# Patient Record
Sex: FEMALE | Race: WHITE | NOT HISPANIC OR LATINO | URBAN - METROPOLITAN AREA
[De-identification: names, ages, dates, MRNs, and addresses within clinical notes are randomized per-mention and may not be internally consistent; named-entity substitution may affect disease eponyms.]

---

## 2018-01-10 ENCOUNTER — OUTPATIENT (OUTPATIENT)
Dept: OUTPATIENT SERVICES | Facility: HOSPITAL | Age: 58
LOS: 1 days | Discharge: HOME | End: 2018-01-10
Payer: COMMERCIAL

## 2018-01-10 VITALS
HEART RATE: 82 BPM | TEMPERATURE: 98 F | HEIGHT: 59 IN | SYSTOLIC BLOOD PRESSURE: 135 MMHG | RESPIRATION RATE: 20 BRPM | OXYGEN SATURATION: 100 % | WEIGHT: 147.93 LBS | DIASTOLIC BLOOD PRESSURE: 74 MMHG

## 2018-01-10 DIAGNOSIS — Z01.818 ENCOUNTER FOR OTHER PREPROCEDURAL EXAMINATION: ICD-10-CM

## 2018-01-10 DIAGNOSIS — Z98.891 HISTORY OF UTERINE SCAR FROM PREVIOUS SURGERY: Chronic | ICD-10-CM

## 2018-01-10 DIAGNOSIS — M85.80 OTHER SPECIFIED DISORDERS OF BONE DENSITY AND STRUCTURE, UNSPECIFIED SITE: ICD-10-CM

## 2018-01-10 DIAGNOSIS — M23.252 DERANGEMENT OF POSTERIOR HORN OF LATERAL MENISCUS DUE TO OLD TEAR OR INJURY, LEFT KNEE: ICD-10-CM

## 2018-01-10 DIAGNOSIS — M94.262 CHONDROMALACIA, LEFT KNEE: ICD-10-CM

## 2018-01-10 DIAGNOSIS — M65.862 OTHER SYNOVITIS AND TENOSYNOVITIS, LEFT LOWER LEG: ICD-10-CM

## 2018-01-10 DIAGNOSIS — Z90.710 ACQUIRED ABSENCE OF BOTH CERVIX AND UTERUS: Chronic | ICD-10-CM

## 2018-01-10 DIAGNOSIS — K21.9 GASTRO-ESOPHAGEAL REFLUX DISEASE WITHOUT ESOPHAGITIS: ICD-10-CM

## 2018-01-10 DIAGNOSIS — Z98.890 OTHER SPECIFIED POSTPROCEDURAL STATES: Chronic | ICD-10-CM

## 2018-01-10 DIAGNOSIS — E66.9 OBESITY, UNSPECIFIED: ICD-10-CM

## 2018-01-10 DIAGNOSIS — M17.12 UNILATERAL PRIMARY OSTEOARTHRITIS, LEFT KNEE: ICD-10-CM

## 2018-01-10 DIAGNOSIS — S83.282A OTHER TEAR OF LATERAL MENISCUS, CURRENT INJURY, LEFT KNEE, INITIAL ENCOUNTER: ICD-10-CM

## 2018-01-10 LAB
ANION GAP SERPL CALC-SCNC: 6 MMOL/L — SIGNIFICANT CHANGE UP (ref 5–17)
APTT BLD: 28.8 SEC — SIGNIFICANT CHANGE UP (ref 27.5–37.4)
BASOPHILS # BLD AUTO: 0.1 K/UL — SIGNIFICANT CHANGE UP (ref 0–0.2)
BASOPHILS NFR BLD AUTO: 0.9 % — SIGNIFICANT CHANGE UP (ref 0–2)
BUN SERPL-MCNC: 14 MG/DL — SIGNIFICANT CHANGE UP (ref 7–23)
CALCIUM SERPL-MCNC: 8.6 MG/DL — SIGNIFICANT CHANGE UP (ref 8.5–10.1)
CHLORIDE SERPL-SCNC: 106 MMOL/L — SIGNIFICANT CHANGE UP (ref 96–108)
CO2 SERPL-SCNC: 29 MMOL/L — SIGNIFICANT CHANGE UP (ref 22–31)
CREAT SERPL-MCNC: 0.57 MG/DL — SIGNIFICANT CHANGE UP (ref 0.5–1.3)
EOSINOPHIL # BLD AUTO: 0.1 K/UL — SIGNIFICANT CHANGE UP (ref 0–0.5)
EOSINOPHIL NFR BLD AUTO: 2.2 % — SIGNIFICANT CHANGE UP (ref 0–6)
GLUCOSE SERPL-MCNC: 81 MG/DL — SIGNIFICANT CHANGE UP (ref 70–99)
HCT VFR BLD CALC: 38 % — SIGNIFICANT CHANGE UP (ref 34.5–45)
HGB BLD-MCNC: 12.4 G/DL — SIGNIFICANT CHANGE UP (ref 11.5–15.5)
INR BLD: 0.99 RATIO — SIGNIFICANT CHANGE UP (ref 0.88–1.16)
LYMPHOCYTES # BLD AUTO: 2.4 K/UL — SIGNIFICANT CHANGE UP (ref 1–3.3)
LYMPHOCYTES # BLD AUTO: 41.6 % — SIGNIFICANT CHANGE UP (ref 13–44)
MCHC RBC-ENTMCNC: 29.2 PG — SIGNIFICANT CHANGE UP (ref 27–34)
MCHC RBC-ENTMCNC: 32.7 GM/DL — SIGNIFICANT CHANGE UP (ref 32–36)
MCV RBC AUTO: 89.4 FL — SIGNIFICANT CHANGE UP (ref 80–100)
MONOCYTES # BLD AUTO: 0.3 K/UL — SIGNIFICANT CHANGE UP (ref 0–0.9)
MONOCYTES NFR BLD AUTO: 5.4 % — SIGNIFICANT CHANGE UP (ref 2–14)
NEUTROPHILS # BLD AUTO: 2.9 K/UL — SIGNIFICANT CHANGE UP (ref 1.8–7.4)
NEUTROPHILS NFR BLD AUTO: 49.9 % — SIGNIFICANT CHANGE UP (ref 43–77)
PLATELET # BLD AUTO: 217 K/UL — SIGNIFICANT CHANGE UP (ref 150–400)
POTASSIUM SERPL-MCNC: 3.6 MMOL/L — SIGNIFICANT CHANGE UP (ref 3.5–5.3)
POTASSIUM SERPL-SCNC: 3.6 MMOL/L — SIGNIFICANT CHANGE UP (ref 3.5–5.3)
PROTHROM AB SERPL-ACNC: 10.7 SEC — SIGNIFICANT CHANGE UP (ref 9.8–12.7)
RBC # BLD: 4.25 M/UL — SIGNIFICANT CHANGE UP (ref 3.8–5.2)
RBC # FLD: 12 % — SIGNIFICANT CHANGE UP (ref 10.3–14.5)
SODIUM SERPL-SCNC: 141 MMOL/L — SIGNIFICANT CHANGE UP (ref 135–145)
WBC # BLD: 5.8 K/UL — SIGNIFICANT CHANGE UP (ref 3.8–10.5)
WBC # FLD AUTO: 5.8 K/UL — SIGNIFICANT CHANGE UP (ref 3.8–10.5)

## 2018-01-10 PROCEDURE — 93010 ELECTROCARDIOGRAM REPORT: CPT

## 2018-01-10 NOTE — H&P PST ADULT - PMH
Chronic sinusitis    GERD (gastroesophageal reflux disease)    Hypercholesterolemia    Obesity    Osteoarthritis    Osteopenia Chronic sinusitis    GERD (gastroesophageal reflux disease)    Hypercholesterolemia    Obesity    Osteoarthritis    Osteopenia    Torn meniscus  left

## 2018-01-10 NOTE — H&P PST ADULT - ASSESSMENT
56 y/o female with left knee meniscus tear. S/P left knee arthroscopy 2x. Complain of left knee pain. Scheduled for left knee arthroscopy, meniscectomy, debridement with Dr. Sudheer MILNER.    Plan  1. Stop all NSAIDS, herbal supplements and vitamins for 7 days.  2. NPO at midnight.  3. Use EZ sponges as directed

## 2018-01-10 NOTE — H&P PST ADULT - NSANTHOSAYNRD_GEN_A_CORE
No. ALVARO screening performed.  STOP BANG Legend: 0-2 = LOW Risk; 3-4 = INTERMEDIATE Risk; 5-8 = HIGH Risk

## 2018-01-10 NOTE — H&P PST ADULT - PSH
H/O  section  1994  H/O: hysterectomy  b/l salpingectomy, oophoretomy left  S/P left knee arthroscopy  ,  H/O  section  1994  H/O: hysterectomy  b/l salpingectomy, oophorectomy left  S/P left knee arthroscopy  ,

## 2018-01-10 NOTE — H&P PST ADULT - HISTORY OF PRESENT ILLNESS
56 y/o female with left knee meniscus tear. S/P left knee arthroscopy 2x. Complain of left knee pain. Here today for PST for left knee arthroscopy, meniscectomy, debridement.

## 2018-01-18 RX ORDER — FAMOTIDINE 10 MG/ML
20 INJECTION INTRAVENOUS ONCE
Qty: 0 | Refills: 0 | Status: COMPLETED | OUTPATIENT
Start: 2018-01-19 | End: 2018-01-19

## 2018-01-18 RX ORDER — ACETAMINOPHEN 500 MG
975 TABLET ORAL ONCE
Qty: 0 | Refills: 0 | Status: COMPLETED | OUTPATIENT
Start: 2018-01-19 | End: 2018-01-19

## 2018-01-18 RX ORDER — SODIUM CHLORIDE 9 MG/ML
3 INJECTION INTRAMUSCULAR; INTRAVENOUS; SUBCUTANEOUS EVERY 8 HOURS
Qty: 0 | Refills: 0 | Status: DISCONTINUED | OUTPATIENT
Start: 2018-01-19 | End: 2018-02-03

## 2018-01-19 ENCOUNTER — RESULT REVIEW (OUTPATIENT)
Age: 58
End: 2018-01-19

## 2018-01-19 ENCOUNTER — OUTPATIENT (OUTPATIENT)
Dept: OUTPATIENT SERVICES | Facility: HOSPITAL | Age: 58
LOS: 1 days | Discharge: HOME | End: 2018-01-19
Payer: COMMERCIAL

## 2018-01-19 VITALS
WEIGHT: 147.93 LBS | OXYGEN SATURATION: 99 % | HEART RATE: 80 BPM | HEIGHT: 59 IN | DIASTOLIC BLOOD PRESSURE: 85 MMHG | TEMPERATURE: 98 F | SYSTOLIC BLOOD PRESSURE: 137 MMHG | RESPIRATION RATE: 16 BRPM

## 2018-01-19 VITALS
OXYGEN SATURATION: 95 % | DIASTOLIC BLOOD PRESSURE: 85 MMHG | HEART RATE: 80 BPM | RESPIRATION RATE: 16 BRPM | TEMPERATURE: 98 F | SYSTOLIC BLOOD PRESSURE: 126 MMHG

## 2018-01-19 DIAGNOSIS — Z90.710 ACQUIRED ABSENCE OF BOTH CERVIX AND UTERUS: Chronic | ICD-10-CM

## 2018-01-19 DIAGNOSIS — Z98.890 OTHER SPECIFIED POSTPROCEDURAL STATES: Chronic | ICD-10-CM

## 2018-01-19 DIAGNOSIS — Z98.891 HISTORY OF UTERINE SCAR FROM PREVIOUS SURGERY: Chronic | ICD-10-CM

## 2018-01-19 PROCEDURE — 88304 TISSUE EXAM BY PATHOLOGIST: CPT | Mod: 26

## 2018-01-19 RX ORDER — ROSUVASTATIN CALCIUM 5 MG/1
1 TABLET ORAL
Qty: 0 | Refills: 0 | COMMUNITY

## 2018-01-19 RX ORDER — SODIUM CHLORIDE 9 MG/ML
1000 INJECTION INTRAMUSCULAR; INTRAVENOUS; SUBCUTANEOUS
Qty: 0 | Refills: 0 | Status: DISCONTINUED | OUTPATIENT
Start: 2018-01-19 | End: 2018-01-23

## 2018-01-19 RX ORDER — OXYCODONE HYDROCHLORIDE 5 MG/1
5 TABLET ORAL EVERY 4 HOURS
Qty: 0 | Refills: 0 | Status: DISCONTINUED | OUTPATIENT
Start: 2018-01-19 | End: 2018-01-23

## 2018-01-19 RX ORDER — ONDANSETRON 8 MG/1
4 TABLET, FILM COATED ORAL ONCE
Qty: 0 | Refills: 0 | Status: DISCONTINUED | OUTPATIENT
Start: 2018-01-19 | End: 2018-01-23

## 2018-01-19 RX ORDER — RALOXIFENE HYDROCHLORIDE 60 MG/1
1 TABLET, COATED ORAL
Qty: 0 | Refills: 0 | COMMUNITY

## 2018-01-19 RX ORDER — DEXLANSOPRAZOLE 30 MG/1
1 CAPSULE, DELAYED RELEASE ORAL
Qty: 0 | Refills: 0 | COMMUNITY

## 2018-01-19 RX ORDER — FENTANYL CITRATE 50 UG/ML
50 INJECTION INTRAVENOUS
Qty: 0 | Refills: 0 | Status: DISCONTINUED | OUTPATIENT
Start: 2018-01-19 | End: 2018-01-23

## 2018-01-19 RX ORDER — EZETIMIBE 10 MG/1
1 TABLET ORAL
Qty: 0 | Refills: 0 | COMMUNITY

## 2018-01-19 RX ADMIN — SODIUM CHLORIDE 100 MILLILITER(S): 9 INJECTION INTRAMUSCULAR; INTRAVENOUS; SUBCUTANEOUS at 10:21

## 2018-01-19 RX ADMIN — FAMOTIDINE 20 MILLIGRAM(S): 10 INJECTION INTRAVENOUS at 07:44

## 2018-01-19 RX ADMIN — Medication 975 MILLIGRAM(S): at 07:43

## 2018-01-19 RX ADMIN — OXYCODONE HYDROCHLORIDE 5 MILLIGRAM(S): 5 TABLET ORAL at 10:25

## 2018-01-19 NOTE — ASU PATIENT PROFILE, ADULT - PSH
H/O  section  1994  H/O: hysterectomy  b/l salpingectomy, oophorectomy left  S/P left knee arthroscopy  ,

## 2018-01-19 NOTE — BRIEF OPERATIVE NOTE - OPERATION/FINDINGS
tear of lateral meniscus of left knee requiring partial meniscectomy. synovectomy and chondroplasty also performed.

## 2018-01-19 NOTE — BRIEF OPERATIVE NOTE - PROCEDURE
<<-----Click on this checkbox to enter Procedure Arthroscopic lateral meniscectomy of left knee  01/19/2018    Active  [id number]

## 2018-01-19 NOTE — ASU DISCHARGE PLAN (ADULT/PEDIATRIC). - MEDICATION SUMMARY - MEDICATIONS TO TAKE
I will START or STAY ON the medications listed below when I get home from the hospital:    Zetia 10 mg oral tablet  -- 1 tab(s) by mouth once a day  -- Indication: For per pmd    Crestor 20 mg oral tablet  -- 1 tab(s) by mouth once a day (at bedtime)  -- Indication: For per pmd    Evista 60 mg oral tablet  -- 1 tab(s) by mouth once a day  -- Indication: For per pmd    Dexilant 60 mg oral delayed release capsule  -- 1 cap(s) by mouth once a day  -- Indication: For per pmd

## 2018-01-19 NOTE — ASU PATIENT PROFILE, ADULT - PMH
Chronic sinusitis    GERD (gastroesophageal reflux disease)    Hypercholesterolemia    Obesity    Osteoarthritis    Osteopenia    Torn meniscus  left

## 2018-01-19 NOTE — ASU DISCHARGE PLAN (ADULT/PEDIATRIC). - NURSING INSTRUCTIONS
Begin with liquids and light food ( tea, toast, Jello, soups). Advance to what you normally eat. Liquids should taken in adequate amounts today.Refer to multi color post op discharge instruction sheet     CALL the DOCTOR:    -Fever greater than  101F  - Signs  of infection such as : increase pain,swelling,redness,or a bad  smell coming from the wound.  -Excessive amount of bleeding.  - Any pain that appears to be getting worse.  - Vomiting  -  If you have  not urinated 8 hours after surgery or have any difficulty urinating.     A responsible adult should be with you for the rest of the day and night for your safety and to help you if you needed.    Review attached FACT SHEET if applicable. For any problems or concerns,contact your doctor. [facility] patients should call the [facility]. If you cannot reach the doctor or clinic, call [facility] Emergency Department at [phone] or go to your local Emergency Department.  A responsible adult should be with you for the rest of the day and night for your safety and to help you if you needed. Resume your medications as listed on the attached Medication Record.

## 2018-01-19 NOTE — ASU DISCHARGE PLAN (ADULT/PEDIATRIC). - NOTIFY
Swelling that continues/Fever greater than 101/Numbness, color, or temperature change to extremity/Numbness, tingling/Pain not relieved by Medications/Bleeding that does not stop

## 2018-01-23 LAB — SURGICAL PATHOLOGY FINAL REPORT - CH: SIGNIFICANT CHANGE UP

## 2018-01-25 DIAGNOSIS — M85.80 OTHER SPECIFIED DISORDERS OF BONE DENSITY AND STRUCTURE, UNSPECIFIED SITE: ICD-10-CM

## 2018-01-25 DIAGNOSIS — M17.12 UNILATERAL PRIMARY OSTEOARTHRITIS, LEFT KNEE: ICD-10-CM

## 2018-01-25 DIAGNOSIS — M94.262 CHONDROMALACIA, LEFT KNEE: ICD-10-CM

## 2018-01-25 DIAGNOSIS — M65.862 OTHER SYNOVITIS AND TENOSYNOVITIS, LEFT LOWER LEG: ICD-10-CM

## 2018-01-25 DIAGNOSIS — Z90.79 ACQUIRED ABSENCE OF OTHER GENITAL ORGAN(S): ICD-10-CM

## 2018-01-25 DIAGNOSIS — Z90.721 ACQUIRED ABSENCE OF OVARIES, UNILATERAL: ICD-10-CM

## 2018-01-25 DIAGNOSIS — K21.9 GASTRO-ESOPHAGEAL REFLUX DISEASE WITHOUT ESOPHAGITIS: ICD-10-CM

## 2018-01-25 DIAGNOSIS — E78.00 PURE HYPERCHOLESTEROLEMIA, UNSPECIFIED: ICD-10-CM

## 2018-01-25 DIAGNOSIS — J32.9 CHRONIC SINUSITIS, UNSPECIFIED: ICD-10-CM

## 2018-01-25 DIAGNOSIS — M23.252 DERANGEMENT OF POSTERIOR HORN OF LATERAL MENISCUS DUE TO OLD TEAR OR INJURY, LEFT KNEE: ICD-10-CM

## 2018-01-25 DIAGNOSIS — E66.9 OBESITY, UNSPECIFIED: ICD-10-CM

## 2018-01-25 DIAGNOSIS — Z90.710 ACQUIRED ABSENCE OF BOTH CERVIX AND UTERUS: ICD-10-CM

## 2019-01-27 ENCOUNTER — TRANSCRIPTION ENCOUNTER (OUTPATIENT)
Age: 59
End: 2019-01-27

## 2019-02-04 PROBLEM — E66.9 OBESITY, UNSPECIFIED: Chronic | Status: ACTIVE | Noted: 2018-01-10

## 2019-02-04 PROBLEM — Z00.00 ENCOUNTER FOR PREVENTIVE HEALTH EXAMINATION: Status: ACTIVE | Noted: 2019-02-04

## 2019-02-04 PROBLEM — M19.90 UNSPECIFIED OSTEOARTHRITIS, UNSPECIFIED SITE: Chronic | Status: ACTIVE | Noted: 2018-01-10

## 2019-02-04 PROBLEM — M85.80 OTHER SPECIFIED DISORDERS OF BONE DENSITY AND STRUCTURE, UNSPECIFIED SITE: Chronic | Status: ACTIVE | Noted: 2018-01-10

## 2019-02-04 PROBLEM — S83.209A UNSPECIFIED TEAR OF UNSPECIFIED MENISCUS, CURRENT INJURY, UNSPECIFIED KNEE, INITIAL ENCOUNTER: Chronic | Status: ACTIVE | Noted: 2018-01-10

## 2019-02-04 PROBLEM — E78.00 PURE HYPERCHOLESTEROLEMIA, UNSPECIFIED: Chronic | Status: ACTIVE | Noted: 2018-01-10

## 2019-02-04 PROBLEM — K21.9 GASTRO-ESOPHAGEAL REFLUX DISEASE WITHOUT ESOPHAGITIS: Chronic | Status: ACTIVE | Noted: 2018-01-10

## 2019-02-04 PROBLEM — J32.9 CHRONIC SINUSITIS, UNSPECIFIED: Chronic | Status: ACTIVE | Noted: 2018-01-10

## 2019-02-05 ENCOUNTER — RECORD ABSTRACTING (OUTPATIENT)
Age: 59
End: 2019-02-05

## 2019-02-05 DIAGNOSIS — Z83.3 FAMILY HISTORY OF DIABETES MELLITUS: ICD-10-CM

## 2019-02-05 DIAGNOSIS — I10 ESSENTIAL (PRIMARY) HYPERTENSION: ICD-10-CM

## 2019-02-05 DIAGNOSIS — Z47.89 ENCOUNTER FOR OTHER ORTHOPEDIC AFTERCARE: ICD-10-CM

## 2019-02-05 DIAGNOSIS — Z78.9 OTHER SPECIFIED HEALTH STATUS: ICD-10-CM

## 2019-02-05 DIAGNOSIS — S83.282A OTHER TEAR OF LATERAL MENISCUS, CURRENT INJURY, LEFT KNEE, INITIAL ENCOUNTER: ICD-10-CM

## 2019-02-05 DIAGNOSIS — Z82.49 FAMILY HISTORY OF ISCHEMIC HEART DISEASE AND OTHER DISEASES OF THE CIRCULATORY SYSTEM: ICD-10-CM

## 2019-02-05 DIAGNOSIS — M17.11 UNILATERAL PRIMARY OSTEOARTHRITIS, RIGHT KNEE: ICD-10-CM

## 2019-02-05 DIAGNOSIS — S83.242A OTHER TEAR OF MEDIAL MENISCUS, CURRENT INJURY, LEFT KNEE, INITIAL ENCOUNTER: ICD-10-CM

## 2019-02-12 ENCOUNTER — APPOINTMENT (OUTPATIENT)
Dept: ORTHOPEDIC SURGERY | Facility: CLINIC | Age: 59
End: 2019-02-12

## 2019-02-14 ENCOUNTER — APPOINTMENT (OUTPATIENT)
Age: 59
End: 2019-02-14
Payer: COMMERCIAL

## 2019-02-14 PROCEDURE — 20610 DRAIN/INJ JOINT/BURSA W/O US: CPT | Mod: LT

## 2019-02-19 NOTE — PROCEDURE
[de-identified] : SANGEETHA VILLA comes in today for the third Supartz injection to the left knee.  \par \par Physical Examination:\par Left Knee:\par Knee: Range of Motion in Degrees  	\par    	                        Claimant:  	            Normal:  	\par Flexion Active   	         135     	            135-degrees  	\par Flexion Passive  	         135  	            135-degrees  	\par Extension Active  	         0-5  	            0-5-degrees  	\par Extension Passive            0-5  	            0-5-degrees  	\par \par No evidence of instability in the AP plane or varus or valgus stress.  Negative  Lachman. Negative pivot shift.  Negative anterior drawer test.  Negative posterior drawer test. Negative Wade.  Negative Apley grind.  No medial or lateral joint line tenderness. Positive tenderness over the medial and lateral facet of the patella.  Positive patellofemoral crepitations.  No lateral tilting patella.  No patellar apprehension. Positive crepitation in the medial and lateral femoral condyle.  No proximal or distal swelling, edema or tenderness.  No gross motor or sensory deficits.  Mild intra-articular swelling.  No varus or valgus malalignment.  2+ DP and PT pulses.  Skin is intact.  No rashes, scars or lesions.  \par \par Impression: \par Osteoarthritis of the left knee\par \par Consent:\par The risks and benefits of the procedure were discussed with the patient in detail.  Upon verbal consent of the patient, we proceeded with the Supartz injection as noted below.  \par \par Procedure:\par After sterile prep, the patient underwent a Supartz injection of 25 mg of Sodium Hyaluronate in a 2ML syringe into the left knee.  The patient tolerated the procedure well.  There were no complications.  \par \par NDC#:  [address]-0\par Lot#:  [id number]\par Exp Date:  05/31/21\par \par Plan:\par I have recommended ice and elevation.  The patient will be reassessed in one week for the next Supartz injection for the left knee osteoarthritis.   \par

## 2019-02-19 NOTE — ADDENDUM
[FreeTextEntry1] : This note was written by Ozzy Slater on 02/18/2019, acting as scribe for Paulina YO

## 2019-02-21 ENCOUNTER — APPOINTMENT (OUTPATIENT)
Age: 59
End: 2019-02-21
Payer: COMMERCIAL

## 2019-02-21 PROCEDURE — 20610 DRAIN/INJ JOINT/BURSA W/O US: CPT | Mod: LT

## 2019-02-25 NOTE — ADDENDUM
[FreeTextEntry1] : This note was written by Asya Muñoz on 02/25/2019 acting as scribe for Manjit Willard III, MD\par

## 2019-02-25 NOTE — PROCEDURE
[de-identified] : SANGEETHA VILLA comes in today for the fourth Supartz injection to the left knee.  \par \par Physical Examination:\par Left Knee:\par Knee: Range of Motion in Degrees  	\par    	                        Claimant:  	            Normal:  	\par Flexion Active   	         135     	            135-degrees  	\par Flexion Passive  	         135  	            135-degrees  	\par Extension Active  	         0-5  	            0-5-degrees  	\par Extension Passive            0-5  	            0-5-degrees  	\par \par No evidence of instability in the AP plane or varus or valgus stress.  Negative  Lachman. Negative pivot shift.  Negative anterior drawer test.  Negative posterior drawer test. Negative Wade.  Negative Apley grind.  No medial or lateral joint line tenderness. Positive tenderness over the medial and lateral facet of the patella.  Positive patellofemoral crepitations.  No lateral tilting patella.  No patellar apprehension. Positive crepitation in the medial and lateral femoral condyle.  No proximal or distal swelling, edema or tenderness.  No gross motor or sensory deficits.  Mild intra-articular swelling.  No varus or valgus malalignment.  2+ DP and PT pulses.  Skin is intact.  No rashes, scars or lesions.  \par \par Impression: \par Osteoarthritis of the left knee\par \par .MP: Unilateral primary osteoarthritis, left knee : ICD9 = 715.16 / ICD10 = M17.12 / SNOMED = [id number]\par \par Consent:\par The risks and benefits of the procedure were discussed with the patient in detail.  Upon verbal consent of the patient, we proceeded with the Supartz injection as noted below.  \par \par Procedure:\par After sterile prep, the patient underwent a Supartz injection of 25 mg of Sodium Hyaluronate in a 2ML syringe into the left knee.  The patient tolerated the procedure well.  There were no complications.  \par \par NDC#: [address]-6\par Lot #:  [id number]                   Exp: 05/31/21\par \par \par Plan:\par I have recommended ice and elevation.  The patient will be reassessed in one week for the next Supartz injection for the left knee osteoarthritis.   \par \par \par

## 2019-03-01 ENCOUNTER — APPOINTMENT (OUTPATIENT)
Dept: ORTHOPEDIC SURGERY | Facility: CLINIC | Age: 59
End: 2019-03-01
Payer: COMMERCIAL

## 2019-03-01 PROCEDURE — 20610 DRAIN/INJ JOINT/BURSA W/O US: CPT | Mod: LT

## 2019-03-11 NOTE — ADDENDUM
[FreeTextEntry1] : This note was written by Preethi Hill on 03/11/2019 acting as a scribe for YUAN ACEVEDO

## 2019-03-11 NOTE — PROCEDURE
[de-identified] : SANGEETHA VILLA comes in today for the fifth Supartz injection to the left knee.  \par \par Physical Examination:\par Left Knee:\par Range of Motion in Degrees  	\par    	                        Claimant:  	            Normal:  	\par Flexion Active   	         135     	            135-degrees  	\par Flexion Passive  	         135  	            135-degrees  	\par Extension Active  	         0-5  	            0-5-degrees  	\par Extension Passive            0-5  	            0-5-degrees  	\par \par No evidence of instability in the AP plane or varus or valgus stress.  Negative  Lachman. Negative pivot shift.  Negative anterior drawer test.  Negative posterior drawer test. Negative Wade.  Negative Apley grind.  No medial or lateral joint line tenderness. Positive tenderness over the medial and lateral facet of the patella.  Positive patellofemoral crepitations.  No lateral tilting patella.  No patellar apprehension. Positive crepitation in the medial and lateral femoral condyle.  No proximal or distal swelling, edema or tenderness.  No gross motor or sensory deficits.  Mild intra-articular swelling.  No varus or valgus malalignment.  2+ DP and PT pulses.  Skin is intact.  No rashes, scars or lesions.  \par \par Impression: \par Osteoarthritis of the left knee\par \par Consent:\par The risks and benefits of the procedure were discussed with the patient in detail.  Upon verbal consent of the patient, we proceeded with the Supartz injection as noted below.  \par \par Procedure:\par After sterile prep, the patient underwent a Supartz injection of 25 mg of Sodium Hyaluronate in a 2ML syringe into the left knee.  The patient tolerated the procedure well.  There were no complications.  \par \par NDC #:         [address]-8\par Lot #:           [id number]                   \par Expiration:   05/31/21\par \par Plan:\par I have recommended ice and elevation.  The patient will be reassessed in one week for the next Supartz injection for the left knee osteoarthritis.   \par \par \par

## 2019-03-14 ENCOUNTER — APPOINTMENT (OUTPATIENT)
Dept: ORTHOPEDIC SURGERY | Facility: CLINIC | Age: 59
End: 2019-03-14
Payer: COMMERCIAL

## 2019-03-14 VITALS
HEART RATE: 82 BPM | DIASTOLIC BLOOD PRESSURE: 84 MMHG | WEIGHT: 145 LBS | SYSTOLIC BLOOD PRESSURE: 130 MMHG | HEIGHT: 58 IN | BODY MASS INDEX: 30.44 KG/M2 | TEMPERATURE: 98.2 F

## 2019-03-14 DIAGNOSIS — Z78.9 OTHER SPECIFIED HEALTH STATUS: ICD-10-CM

## 2019-03-14 PROCEDURE — 20610 DRAIN/INJ JOINT/BURSA W/O US: CPT | Mod: LT

## 2019-03-14 PROCEDURE — 99213 OFFICE O/P EST LOW 20 MIN: CPT | Mod: 25

## 2019-03-14 PROCEDURE — 73502 X-RAY EXAM HIP UNI 2-3 VIEWS: CPT | Mod: LT

## 2019-03-15 ENCOUNTER — TRANSCRIPTION ENCOUNTER (OUTPATIENT)
Age: 59
End: 2019-03-15

## 2019-04-01 PROBLEM — Z78.9 NO PERTINENT PAST MEDICAL HISTORY: Status: RESOLVED | Noted: 2019-04-01 | Resolved: 2019-04-01

## 2019-04-01 NOTE — HISTORY OF PRESENT ILLNESS
[de-identified] : The patient comes in today stating her knees are doing okay.  She is having persistent complaints of pain to her left hip.  She has had this problem in the past.

## 2019-04-01 NOTE — PHYSICAL EXAM
[de-identified] : Left Hip: Range of Motion in Degrees:\par 	                                 Claimant:	          Normal:	\par Flexion (Active) 	                 120 	          120-degrees	\par Flexion (Passive)	                 120	          120-degrees	\par Extension (Active)	                 -30	          -30-degrees	\par Extension (Passive)	 -30	          -30-degrees	\par Abduction (Active)	                45-50	          75-49-zermshp	\par Abduction (Passive)	45-50	          26-80-jiyyrlu	\par Adduction (Active)                	20-30	          14-34-ibcvzel	\par Adduction (Passive)	20-30	          54-44-pyuddrw	\par Internal Rotation (Active) 	35	          35-degrees	\par Internal Rotation (Passive)	35	          35-degrees	\par External Rotation (Active)	45	          45-degrees	\par External Rotation (Passive)	45	          45-degrees	\par \par No tenderness with internal or external rotation or axial load.  Point tenderness over the greater trochanter with pain with resisted abduction.  Negative Trendelenburg.  No weakness to flexion, extension, abduction or adduction.  No evidence of instability.  No motor or sensory deficits.  2+ DP and PT pulses.  Skin is intact.  No scars, rashes or lesions.  \par   [de-identified] : Gait and Station:  Ambulating with a slightly antalgic to antalgic gait.  Station:  Normal.  [de-identified] : Appearance:  Well-developed, well-nourished female in no acute distress. \par  [de-identified] : Radiographs, two to three views of the left hip, show mild degenerative changes.

## 2019-04-01 NOTE — ADDENDUM
[FreeTextEntry1] : This note was written by Gala Yepez on 04/01/2019 acting as scribe for FADY Gaona MD

## 2019-04-01 NOTE — PROCEDURE
[de-identified] : Consent: \par At this time, I have recommended an injection to the left hip.  The risks and benefits of the procedure were discussed with the patient in detail.  Upon verbal consent of the patient, we proceeded with the injection as noted below.  \par \par Procedure:  \par After a sterile prep, the patient underwent an injection of 9 cc of 1% Lidocaine without epinephrine and 1 cc of Kenalog into the left hip.  The patient tolerated the procedure well.  There were no complications.  \par \par

## 2019-04-09 PROBLEM — M17.12 PRIMARY OSTEOARTHRITIS OF LEFT KNEE: Status: ACTIVE | Noted: 2019-02-05

## 2019-04-11 ENCOUNTER — APPOINTMENT (OUTPATIENT)
Dept: ORTHOPEDIC SURGERY | Facility: CLINIC | Age: 59
End: 2019-04-11
Payer: COMMERCIAL

## 2019-04-11 VITALS — WEIGHT: 145 LBS | TEMPERATURE: 98.3 F | HEIGHT: 58 IN | BODY MASS INDEX: 30.44 KG/M2

## 2019-04-11 DIAGNOSIS — Z87.39 PERSONAL HISTORY OF OTHER DISEASES OF THE MUSCULOSKELETAL SYSTEM AND CONNECTIVE TISSUE: ICD-10-CM

## 2019-04-11 DIAGNOSIS — Z78.9 OTHER SPECIFIED HEALTH STATUS: ICD-10-CM

## 2019-04-11 DIAGNOSIS — M17.12 UNILATERAL PRIMARY OSTEOARTHRITIS, LEFT KNEE: ICD-10-CM

## 2019-04-11 DIAGNOSIS — M70.62 TROCHANTERIC BURSITIS, LEFT HIP: ICD-10-CM

## 2019-04-11 PROCEDURE — 99212 OFFICE O/P EST SF 10 MIN: CPT

## 2019-04-11 RX ORDER — DICLOFENAC SODIUM 10 MG/G
1 GEL TOPICAL
Qty: 1 | Refills: 0 | Status: ACTIVE | COMMUNITY
Start: 2019-04-11 | End: 1900-01-01

## 2019-04-12 PROBLEM — Z87.39 HISTORY OF HERNIATED INTERVERTEBRAL DISC: Status: RESOLVED | Noted: 2019-04-11 | Resolved: 2019-04-12

## 2019-04-12 PROBLEM — Z87.39 HISTORY OF OSTEOPOROSIS: Status: RESOLVED | Noted: 2019-04-11 | Resolved: 2019-04-12

## 2019-04-12 PROBLEM — Z87.39 HISTORY OF SPINAL STENOSIS: Status: RESOLVED | Noted: 2019-04-11 | Resolved: 2019-04-12

## 2019-04-12 PROBLEM — Z78.9 NEVER EXERCISES: Status: ACTIVE | Noted: 2019-04-11

## 2019-04-19 NOTE — DISCUSSION/SUMMARY
[de-identified] : At this time, due to trochanteric bursitis of the left hip and osteoarthritis of the left knee, I recommended that she undergo home therapeutic modalities. She was given a script for topical anti-inflammatory cream.  She will be reassessed in 4-6 weeks.\par \par \par

## 2019-04-19 NOTE — PHYSICAL EXAM
[de-identified] : Left Knee: Range of Motion in Degrees	\par 	                  Claimant:	Normal:	\par Flexion Active	  135 	                135-degrees	\par Flexion Passive	  135	                135-degrees	\par Extension Active	  0-5	                0-5-degrees	\par Extension Passive	  0-5	                0-5-degrees	\par \par No weakness to flexion/extension.  No evidence of instability in the AP plane or varus or valgus stress.  Negative  Lachman.  Negative pivot shift.  Negative anterior drawer test.  Negative posterior drawer test.  Negative Wade.  Negative Apley grind.  No medial or lateral joint line tenderness.  Positive tenderness over the medial and lateral facet of the patella.  Positive patellofemoral crepitations.  No lateral tilting patella.  No patella apprehension.  Positive crepitation in the medial and lateral femoral condyle.  No proximal or distal swelling, edema or tenderness.  No gross motor or sensory deficits.  Mild intra-articular swelling.  2+ DP and PT pulses.  No varus or valgus malalignment.  Skin is intact.  No rashes, scars or lesions.  \par \par Left Hip: Range of Motion in Degrees:\par 	                                 Claimant:	          Normal:	\par Flexion (Active) 	                 120 	          120-degrees	\par Flexion (Passive)	                 120	          120-degrees	\par Extension (Active)	                 -30	          -30-degrees	\par Extension (Passive)	 -30	          -30-degrees	\par Abduction (Active)	                45-50	          74-13-jpjpdoj	\par Abduction (Passive)	45-50	          65-43-toydhms	\par Adduction (Active)                	20-30	          88-93-woqbush	\par Adduction (Passive)	20-30	          01-63-ufekkdh	\par Internal Rotation (Active) 	35	          35-degrees	\par Internal Rotation (Passive)	35	          35-degrees	\par External Rotation (Active)	45	          45-degrees	\par External Rotation (Passive)	45	          45-degrees	\par \par No tenderness with internal or external rotation or axial load.  Point tenderness over the greater trochanter with pain with resisted abduction.  Negative Trendelenburg.  No weakness to flexion, extension, abduction or adduction.  No evidence of instability.  No motor or sensory deficits.  2+ DP and PT pulses.  Skin is intact.  No scars, rashes or lesions.  \par \par   [de-identified] : Gait and Station:  Ambulating with a slightly antalgic to antalgic gait.  Normal Station.  [de-identified] : Appearance:  Well developed, well-nourished female in no acute distress.

## 2019-04-19 NOTE — HISTORY OF PRESENT ILLNESS
[de-identified] : The patient comes in today with some persistent complaints of pain to her left hip and left knee, but she states she is much better than she was.\par \par

## 2019-07-21 ENCOUNTER — TRANSCRIPTION ENCOUNTER (OUTPATIENT)
Age: 59
End: 2019-07-21

## 2020-01-01 NOTE — H&P PST ADULT - NSANTHGENDERRD_ENT_A_CORE
RN, RT and Neonatologist called to OR prior to the delivery of a female infant, Gestational Age: 38w4d, for fetal heart tones. Apgars were 8/9.  Infant stabilized and left under radiant warmer in care of L&D staff.      See resuscitation sheet / delivery summary for more detailed information.   No

## 2020-10-12 ENCOUNTER — RECORD ABSTRACTING (OUTPATIENT)
Age: 60
End: 2020-10-12

## 2020-10-12 ENCOUNTER — APPOINTMENT (OUTPATIENT)
Dept: OTOLARYNGOLOGY | Facility: CLINIC | Age: 60
End: 2020-10-12
Payer: COMMERCIAL

## 2020-10-12 VITALS
HEIGHT: 58 IN | TEMPERATURE: 97.4 F | BODY MASS INDEX: 29.18 KG/M2 | WEIGHT: 139 LBS | SYSTOLIC BLOOD PRESSURE: 153 MMHG | DIASTOLIC BLOOD PRESSURE: 94 MMHG

## 2020-10-12 DIAGNOSIS — R09.82 POSTNASAL DRIP: ICD-10-CM

## 2020-10-12 DIAGNOSIS — J32.3 CHRONIC SPHENOIDAL SINUSITIS: ICD-10-CM

## 2020-10-12 DIAGNOSIS — J31.0 CHRONIC RHINITIS: ICD-10-CM

## 2020-10-12 DIAGNOSIS — Z87.898 PERSONAL HISTORY OF OTHER SPECIFIED CONDITIONS: ICD-10-CM

## 2020-10-12 DIAGNOSIS — Z87.09 PERSONAL HISTORY OF OTHER DISEASES OF THE RESPIRATORY SYSTEM: ICD-10-CM

## 2020-10-12 DIAGNOSIS — J32.0 CHRONIC MAXILLARY SINUSITIS: ICD-10-CM

## 2020-10-12 DIAGNOSIS — J34.3 HYPERTROPHY OF NASAL TURBINATES: ICD-10-CM

## 2020-10-12 DIAGNOSIS — G50.1 ATYPICAL FACIAL PAIN: ICD-10-CM

## 2020-10-12 DIAGNOSIS — J32.9 CHRONIC SINUSITIS, UNSPECIFIED: ICD-10-CM

## 2020-10-12 PROCEDURE — 31231 NASAL ENDOSCOPY DX: CPT

## 2020-10-12 PROCEDURE — 99213 OFFICE O/P EST LOW 20 MIN: CPT | Mod: 25

## 2020-10-12 RX ORDER — IBUPROFEN 200 MG/1
TABLET, COATED ORAL
Refills: 0 | Status: DISCONTINUED | COMMUNITY
End: 2020-10-12

## 2020-10-12 RX ORDER — RALOXIFENE HYDROCHLORIDE 60 MG/1
60 TABLET ORAL
Refills: 0 | Status: ACTIVE | COMMUNITY

## 2020-10-12 RX ORDER — RALOXIFENE HYDROCHLORIDE 60 MG/1
TABLET ORAL
Refills: 0 | Status: DISCONTINUED | COMMUNITY
End: 2020-10-12

## 2020-10-12 RX ORDER — ROSUVASTATIN CALCIUM 5 MG/1
5 TABLET, FILM COATED ORAL
Refills: 0 | Status: ACTIVE | COMMUNITY

## 2020-10-12 RX ORDER — DICLOFENAC SODIUM 10 MG/G
1 GEL TOPICAL
Refills: 0 | Status: DISCONTINUED | COMMUNITY
End: 2020-10-12

## 2020-10-12 RX ORDER — DEXLANSOPRAZOLE 60 MG/1
CAPSULE, DELAYED RELEASE ORAL
Refills: 0 | Status: DISCONTINUED | COMMUNITY
End: 2020-10-12

## 2020-10-12 RX ORDER — AZELASTINE HYDROCHLORIDE 0.5 MG/ML
0.05 SOLUTION/ DROPS OPHTHALMIC
Refills: 0 | Status: ACTIVE | COMMUNITY

## 2020-10-12 RX ORDER — EZETIMIBE 10 MG/1
TABLET ORAL
Refills: 0 | Status: DISCONTINUED | COMMUNITY
End: 2020-10-12

## 2020-10-12 RX ORDER — IPRATROPIUM BROMIDE 42 UG/1
0.06 SPRAY NASAL 4 TIMES DAILY
Qty: 1 | Refills: 5 | Status: ACTIVE | COMMUNITY
Start: 2020-10-12 | End: 1900-01-01

## 2020-10-12 RX ORDER — ROSUVASTATIN CALCIUM 5 MG/1
TABLET, FILM COATED ORAL
Refills: 0 | Status: DISCONTINUED | COMMUNITY
End: 2020-10-12

## 2020-10-12 RX ORDER — EZETIMIBE 10 MG/1
10 TABLET ORAL
Refills: 0 | Status: ACTIVE | COMMUNITY

## 2020-10-12 RX ORDER — HYALURONATE SODIUM 10 MG/ML
25 SYRINGE (ML) INTRAARTICULAR
Refills: 0 | Status: DISCONTINUED | COMMUNITY
End: 2020-10-12

## 2020-10-12 RX ORDER — HYALURONATE SODIUM 10 MG/ML
SYRINGE (ML) INTRAARTICULAR
Refills: 0 | Status: DISCONTINUED | COMMUNITY
End: 2020-10-12

## 2020-10-12 NOTE — PROCEDURE
[FreeTextEntry6] : Procedure:  Rigid Nasal Endoscopy: Risks, benefits, and alternatives of rigid endoscopy were explained to the patient.  Specific mention was made of the risk of throat numbness. The patient gave oral consent to proceed.  The nasal cavities were decongested and anesthetized with a combination of oxymetazoline and 4% lidocaine solution.  The rigid scope was inserted into the left nasal cavity.  Endoscopy of the inferior and middle meatus was performed.  No polyp, mass, or lesion was appreciated.  Olfactory cleft was clear. Spheno-ethmoid recess clear. Nasopharynx was clear.  Turbinates were without mass. Wide open, clean and clear.  The procedure was repeated on the contralateral side with similar findings.

## 2020-10-12 NOTE — CONSULT LETTER
[Dear  ___] : Dear  [unfilled], [Courtesy Letter:] : I had the pleasure of seeing your patient, [unfilled], in my office today. [Please see my note below.] : Please see my note below. [Consult Closing:] : Thank you very much for allowing me to participate in the care of this patient.  If you have any questions, please do not hesitate to contact me. [Sincerely,] : Sincerely, [FreeTextEntry3] : Avelino Holman MD FACS

## 2020-10-12 NOTE — PHYSICAL EXAM
[Normal] : no rashes [FreeTextEntry1] : elongated uvula, no PND [FreeTextEntry2] : tender over the left temporal artery, bilateral maxillaries, bilateral ethmoids right greater than left

## 2020-10-12 NOTE — HISTORY OF PRESENT ILLNESS
[de-identified] : The patient presents s/p right sphenoid with balloon dilations, left maxillary antrostomy with balloon dilations on 8/20/2020. Pt states that her sx are the same and has had no improvement. She is still getting a sore throat, swollen glands and PND. She is using the sinus rinse with nothing coming out.

## 2020-10-12 NOTE — REVIEW OF SYSTEMS
[Ear Pain] : ear pain [Ear Itch] : ear itch [Recurrent Ear Infections] : recurrent ear infections [Problem Snoring] : problem snoring [Throat Clearing] : throat clearing [Negative] : Heme/Lymph [de-identified] : cough

## 2020-10-12 NOTE — ADDENDUM
[FreeTextEntry1] : Documented by Perlita Adhikari acting as scribe for Dr. Holman on 10/12/2020.\par \par All Medical record entries made by the Scribe were at my, Dr. Holman, direction and personally dictated by me on 10/12/2020 . I have reviewed the chart and agree that the record accurately reflects my personal performance of the history, physical exam, assessment and plan. I have also personally directed, reviewed, and agreed with the discharge instructions.

## 2020-10-12 NOTE — ASSESSMENT
[FreeTextEntry1] : s/p right sphenoid with balloon dilations, left maxillary antrostomy with balloon dilations on 8/20/2020 - open, clean and clear\par sinus tenderness to percussion\par \par Plan:\par Rigid nasal endoscopy. Continue sinus rinse BID at breakfast and dinner time. Atrovent - 1 or 2 sprays bilaterally up to QID, spray laterally. Discussed r/b/a of MRI if no improvement.

## 2021-02-05 ENCOUNTER — LABORATORY RESULT (OUTPATIENT)
Age: 61
End: 2021-02-05

## 2022-01-25 ENCOUNTER — OUTPATIENT (OUTPATIENT)
Dept: OUTPATIENT SERVICES | Facility: HOSPITAL | Age: 62
LOS: 1 days | End: 2022-01-25

## 2022-01-25 DIAGNOSIS — Z98.890 OTHER SPECIFIED POSTPROCEDURAL STATES: Chronic | ICD-10-CM

## 2022-01-25 DIAGNOSIS — Z98.891 HISTORY OF UTERINE SCAR FROM PREVIOUS SURGERY: Chronic | ICD-10-CM

## 2022-01-25 DIAGNOSIS — Z90.710 ACQUIRED ABSENCE OF BOTH CERVIX AND UTERUS: Chronic | ICD-10-CM

## 2022-01-31 DIAGNOSIS — M47.816 SPONDYLOSIS WITHOUT MYELOPATHY OR RADICULOPATHY, LUMBAR REGION: ICD-10-CM

## 2022-01-31 DIAGNOSIS — M81.0 AGE-RELATED OSTEOPOROSIS WITHOUT CURRENT PATHOLOGICAL FRACTURE: ICD-10-CM

## 2022-01-31 DIAGNOSIS — E78.00 PURE HYPERCHOLESTEROLEMIA, UNSPECIFIED: ICD-10-CM

## 2023-04-21 ENCOUNTER — NON-APPOINTMENT (OUTPATIENT)
Age: 63
End: 2023-04-21

## 2023-05-10 ENCOUNTER — APPOINTMENT (OUTPATIENT)
Dept: GASTROENTEROLOGY | Facility: CLINIC | Age: 63
End: 2023-05-10
Payer: COMMERCIAL

## 2023-05-10 ENCOUNTER — NON-APPOINTMENT (OUTPATIENT)
Age: 63
End: 2023-05-10

## 2023-05-10 VITALS
SYSTOLIC BLOOD PRESSURE: 120 MMHG | OXYGEN SATURATION: 98 % | RESPIRATION RATE: 16 BRPM | HEART RATE: 70 BPM | BODY MASS INDEX: 29.6 KG/M2 | WEIGHT: 141 LBS | HEIGHT: 58 IN | DIASTOLIC BLOOD PRESSURE: 70 MMHG

## 2023-05-10 DIAGNOSIS — R10.11 RIGHT UPPER QUADRANT PAIN: ICD-10-CM

## 2023-05-10 PROCEDURE — 99203 OFFICE O/P NEW LOW 30 MIN: CPT

## 2023-05-10 PROCEDURE — 99072 ADDL SUPL MATRL&STAF TM PHE: CPT

## 2023-05-10 RX ORDER — SODIUM SULFATE, POTASSIUM SULFATE AND MAGNESIUM SULFATE 1.6; 3.13; 17.5 G/177ML; G/177ML; G/177ML
17.5-3.13-1.6 SOLUTION ORAL
Qty: 1 | Refills: 0 | Status: ACTIVE | COMMUNITY
Start: 2023-05-10 | End: 1900-01-01

## 2023-05-10 RX ORDER — METFORMIN ER 500 MG 500 MG/1
500 TABLET ORAL
Refills: 0 | Status: ACTIVE | COMMUNITY

## 2023-05-10 NOTE — PHYSICAL EXAM
[Alert] : alert [Normal Voice/Communication] : normal voice/communication [Healthy Appearing] : healthy appearing [No Acute Distress] : no acute distress [Sclera] : the sclera and conjunctiva were normal [Hearing Threshold Finger Rub Not [place]] : hearing was normal [Normal Lips/Gums] : the lips and gums were normal [Oropharynx] : the oropharynx was normal [Normal Appearance] : the appearance of the neck was normal [No Neck Mass] : no neck mass was observed [No Respiratory Distress] : no respiratory distress [No Acc Muscle Use] : no accessory muscle use [Respiration, Rhythm And Depth] : normal respiratory rhythm and effort [Auscultation Breath Sounds / Voice Sounds] : lungs were clear to auscultation bilaterally [Heart Rate And Rhythm] : heart rate was normal and rhythm regular [Normal S1, S2] : normal S1 and S2 [Murmurs] : no murmurs [Bowel Sounds] : normal bowel sounds [No Masses] : no abdominal mass palpated [Abdomen Soft] : soft [] : no hepatosplenomegaly [RUQ] : in the right upper quadrant [Oriented To Time, Place, And Person] : oriented to person, place, and time

## 2023-05-10 NOTE — ADDENDUM
[FreeTextEntry1] : I, Odalis Amezcua PA-C, acted as a scribe for the services dictated to me by J CARLOS Sanchez in this document on May 10, 2023 for SANGEETHA VILLA .\par \par I have personally seen and examined the patient. I agree with the history, physical examination, assessment and recommendations as noted above.\par \par J Carlos Gaitan MD\par Gastroenterology\par

## 2023-05-10 NOTE — HISTORY OF PRESENT ILLNESS
[FreeTextEntry1] : Patient is a 63 year old female, with PMH of pre-DM, HLD, osteoporosis, chronic GERD, who presents for colon cancer screening and evaluation of chronic GERD, along with new onset RUQ pain. \par \par Pt has had a colonoscopy in the past, about 15 years ago, done at the time due to constipation. Normal per pt. No family h/o colon cancer or polyps but has a brother with Crohns disease. \par \par PT has chronic GERD. She had an EGD many years ago with Dr. Preston, normal at that time. She has been on Dexilant PO QD since then, Currently taking Dexilant 30mg PO QD but has breakthrough symptoms 3-4x/week where she takes Tums with some relief. No dysphagia. She does have post nasal drip and sometimes has a globus sensation. \par \par Pt has chronically irregular BMs with incomplete emptying, sometimes loose and sometimes constipation. No blood per rectum.\par \par Pt also mentions new onset RUQ pain that started about 1 week ago, unrelated to food, worse with sitting down. \par \par Patient denies any significant cardiac or pulmonary conditions. \par \par Patient denies dysphagia, rectal bleeding, nausea, vomiting, or unexplained weight loss.\par

## 2023-05-10 NOTE — ASSESSMENT
[FreeTextEntry1] : Patient is a 63 year old female, with PMH of pre-DM, HLD, osteoporosis, chronic GERD, who presents for colon cancer screening and evaluation of chronic GERD, along with new onset RUQ pain. \par \par GERD, breakthrough symptoms on Dexilant 30mg PO QD\par - EGD to be scheduled to r/o gastritis, esophagitis, Bhatia's Esophagus, or PUD. Indication, risks and benefit of EGD discussed with patient in detail. All questions answered. Patient is medically optimized for EGD.\par - Pt does not want to change dose of Dexilant at this time, she understands if there is significant esophagitis at the time of EGD, we could consider adjusting anti-acid medications\par \par Colon cancer screening due\par - Colonoscopy to be scheduled. I have discussed the indications, risks and benefits of procedure with patient. Risks include, but not limited to, bleeding, perforation, infection, and reaction to anesthesia. Alternatives to colonoscopy discussed with patient. Patient was given the opportunity to ask questions, all questions were answered. The patient agrees to proceed with colonoscopy. Patient is medically optimized for colonoscopy. \par - Suprep to be used. Bowel prep instructions discussed at length. \par - CBC/CMP, PT/INR ordered prior to procedure\par \par - Discussed adding Benefiber supplement due to irregular BMs \par \par New onset RUQ pain, tenderness on exam \par - Update labs to include LFTs\par - Will order RUQ U/S to r/o biliary colic

## 2023-05-16 LAB
ALBUMIN SERPL ELPH-MCNC: 4.4 G/DL
ALP BLD-CCNC: 54 U/L
ALT SERPL-CCNC: 12 U/L
ANION GAP SERPL CALC-SCNC: 14 MMOL/L
AST SERPL-CCNC: 16 U/L
BASOPHILS # BLD AUTO: 0.03 K/UL
BASOPHILS NFR BLD AUTO: 0.5 %
BILIRUB SERPL-MCNC: 0.5 MG/DL
BUN SERPL-MCNC: 18 MG/DL
CALCIUM SERPL-MCNC: 9.6 MG/DL
CHLORIDE SERPL-SCNC: 106 MMOL/L
CO2 SERPL-SCNC: 26 MMOL/L
CREAT SERPL-MCNC: 0.66 MG/DL
EGFR: 99 ML/MIN/1.73M2
EOSINOPHIL # BLD AUTO: 0.17 K/UL
EOSINOPHIL NFR BLD AUTO: 2.6 %
GLUCOSE SERPL-MCNC: 106 MG/DL
HCT VFR BLD CALC: 39.5 %
HGB BLD-MCNC: 12 G/DL
IMM GRANULOCYTES NFR BLD AUTO: 0.2 %
INR PPP: 0.91 RATIO
LYMPHOCYTES # BLD AUTO: 2.87 K/UL
LYMPHOCYTES NFR BLD AUTO: 44.3 %
MAN DIFF?: NORMAL
MCHC RBC-ENTMCNC: 28.6 PG
MCHC RBC-ENTMCNC: 30.4 GM/DL
MCV RBC AUTO: 94.3 FL
MONOCYTES # BLD AUTO: 0.52 K/UL
MONOCYTES NFR BLD AUTO: 8 %
NEUTROPHILS # BLD AUTO: 2.88 K/UL
NEUTROPHILS NFR BLD AUTO: 44.4 %
PLATELET # BLD AUTO: 190 K/UL
POTASSIUM SERPL-SCNC: 4 MMOL/L
PROT SERPL-MCNC: 6.5 G/DL
PT BLD: 10.5 SEC
RBC # BLD: 4.19 M/UL
RBC # FLD: 13.7 %
SODIUM SERPL-SCNC: 145 MMOL/L
WBC # FLD AUTO: 6.48 K/UL

## 2023-05-19 ENCOUNTER — OUTPATIENT (OUTPATIENT)
Dept: OUTPATIENT SERVICES | Facility: HOSPITAL | Age: 63
LOS: 1 days | End: 2023-05-19
Payer: COMMERCIAL

## 2023-05-19 ENCOUNTER — APPOINTMENT (OUTPATIENT)
Dept: ULTRASOUND IMAGING | Facility: CLINIC | Age: 63
End: 2023-05-19
Payer: COMMERCIAL

## 2023-05-19 DIAGNOSIS — Z98.890 OTHER SPECIFIED POSTPROCEDURAL STATES: Chronic | ICD-10-CM

## 2023-05-19 DIAGNOSIS — Z90.710 ACQUIRED ABSENCE OF BOTH CERVIX AND UTERUS: Chronic | ICD-10-CM

## 2023-05-19 DIAGNOSIS — Z98.891 HISTORY OF UTERINE SCAR FROM PREVIOUS SURGERY: Chronic | ICD-10-CM

## 2023-05-19 DIAGNOSIS — R10.11 RIGHT UPPER QUADRANT PAIN: ICD-10-CM

## 2023-05-19 PROCEDURE — 76705 ECHO EXAM OF ABDOMEN: CPT

## 2023-05-19 PROCEDURE — 76705 ECHO EXAM OF ABDOMEN: CPT | Mod: 26

## 2023-06-22 ENCOUNTER — TRANSCRIPTION ENCOUNTER (OUTPATIENT)
Age: 63
End: 2023-06-22

## 2023-06-23 ENCOUNTER — APPOINTMENT (OUTPATIENT)
Dept: GASTROENTEROLOGY | Facility: GI CENTER | Age: 63
End: 2023-06-23
Payer: COMMERCIAL

## 2023-06-23 ENCOUNTER — OUTPATIENT (OUTPATIENT)
Dept: OUTPATIENT SERVICES | Facility: HOSPITAL | Age: 63
LOS: 1 days | End: 2023-06-23
Payer: COMMERCIAL

## 2023-06-23 ENCOUNTER — RESULT REVIEW (OUTPATIENT)
Age: 63
End: 2023-06-23

## 2023-06-23 DIAGNOSIS — K64.8 OTHER HEMORRHOIDS: ICD-10-CM

## 2023-06-23 DIAGNOSIS — K21.9 GASTRO-ESOPHAGEAL REFLUX DISEASE WITHOUT ESOPHAGITIS: ICD-10-CM

## 2023-06-23 DIAGNOSIS — Z12.11 ENCOUNTER FOR SCREENING FOR MALIGNANT NEOPLASM OF COLON: ICD-10-CM

## 2023-06-23 DIAGNOSIS — K31.7 POLYP OF STOMACH AND DUODENUM: ICD-10-CM

## 2023-06-23 DIAGNOSIS — Z98.890 OTHER SPECIFIED POSTPROCEDURAL STATES: Chronic | ICD-10-CM

## 2023-06-23 DIAGNOSIS — K44.9 DIAPHRAGMATIC HERNIA W/OUT OBSTRUCTION OR GANGRENE: ICD-10-CM

## 2023-06-23 DIAGNOSIS — Z90.710 ACQUIRED ABSENCE OF BOTH CERVIX AND UTERUS: Chronic | ICD-10-CM

## 2023-06-23 DIAGNOSIS — K57.30 DIVERTICULOSIS OF LARGE INTESTINE W/OUT PERFORATION OR ABSCESS W/OUT BLEEDING: ICD-10-CM

## 2023-06-23 DIAGNOSIS — Z98.891 HISTORY OF UTERINE SCAR FROM PREVIOUS SURGERY: Chronic | ICD-10-CM

## 2023-06-23 LAB — GLUCOSE BLDC GLUCOMTR-MCNC: 84 MG/DL — SIGNIFICANT CHANGE UP (ref 70–99)

## 2023-06-23 PROCEDURE — 45380 COLONOSCOPY AND BIOPSY: CPT | Mod: 33

## 2023-06-23 PROCEDURE — 45380 COLONOSCOPY AND BIOPSY: CPT | Mod: PT

## 2023-06-23 PROCEDURE — 43239 EGD BIOPSY SINGLE/MULTIPLE: CPT

## 2023-06-23 PROCEDURE — 88305 TISSUE EXAM BY PATHOLOGIST: CPT

## 2023-06-23 PROCEDURE — 88342 IMHCHEM/IMCYTCHM 1ST ANTB: CPT | Mod: 26

## 2023-06-23 PROCEDURE — 88342 IMHCHEM/IMCYTCHM 1ST ANTB: CPT

## 2023-06-23 PROCEDURE — 88305 TISSUE EXAM BY PATHOLOGIST: CPT | Mod: 26

## 2023-06-23 PROCEDURE — 43239 EGD BIOPSY SINGLE/MULTIPLE: CPT | Mod: 59

## 2023-06-23 PROCEDURE — 82962 GLUCOSE BLOOD TEST: CPT

## 2023-06-28 ENCOUNTER — NON-APPOINTMENT (OUTPATIENT)
Age: 63
End: 2023-06-28

## 2023-06-28 LAB — SURGICAL PATHOLOGY STUDY: SIGNIFICANT CHANGE UP

## 2023-06-28 RX ORDER — DEXLANSOPRAZOLE 30 MG/1
30 CAPSULE, DELAYED RELEASE ORAL
Refills: 0 | Status: DISCONTINUED | COMMUNITY
End: 2023-06-28

## 2023-09-22 ENCOUNTER — APPOINTMENT (OUTPATIENT)
Dept: GASTROENTEROLOGY | Facility: CLINIC | Age: 63
End: 2023-09-22
Payer: COMMERCIAL

## 2023-09-22 VITALS
BODY MASS INDEX: 28.55 KG/M2 | WEIGHT: 136 LBS | OXYGEN SATURATION: 96 % | RESPIRATION RATE: 16 BRPM | SYSTOLIC BLOOD PRESSURE: 141 MMHG | HEART RATE: 88 BPM | TEMPERATURE: 97.7 F | DIASTOLIC BLOOD PRESSURE: 95 MMHG | HEIGHT: 58 IN

## 2023-09-22 DIAGNOSIS — K63.5 POLYP OF COLON: ICD-10-CM

## 2023-09-22 DIAGNOSIS — K21.9 GASTRO-ESOPHAGEAL REFLUX DISEASE W/OUT ESOPHAGITIS: ICD-10-CM

## 2023-09-22 PROCEDURE — 99214 OFFICE O/P EST MOD 30 MIN: CPT

## 2023-09-22 RX ORDER — MELOXICAM 7.5 MG/1
7.5 TABLET ORAL
Refills: 0 | Status: DISCONTINUED | COMMUNITY
End: 2023-09-22

## 2023-10-05 ENCOUNTER — TRANSCRIPTION ENCOUNTER (OUTPATIENT)
Age: 63
End: 2023-10-05

## 2023-10-09 ENCOUNTER — TRANSCRIPTION ENCOUNTER (OUTPATIENT)
Age: 63
End: 2023-10-09

## 2023-10-11 ENCOUNTER — TRANSCRIPTION ENCOUNTER (OUTPATIENT)
Age: 63
End: 2023-10-11

## 2023-12-19 ENCOUNTER — APPOINTMENT (OUTPATIENT)
Dept: OBGYN | Facility: CLINIC | Age: 63
End: 2023-12-19

## 2024-03-05 NOTE — ADDENDUM
[FreeTextEntry1] : This note was written by Ozzy Slater on 04/19/2019, acting as a scribe for Manjit Willard III, MD  atorvastatin (LIPITOR) 20 mg tablet          Sig: TAKE 1 TABLET BY MOUTH EVERY DAY    Disp: 90 tablet    Refills: 0 (Pharmacy requested: 3)    Start: 3/4/2024    Class: Normal    Authorized by: Vasquez Ren DO    Non-formulary For: Mixed hyperlipidemia        To be filled at: [facility]/pharmacy #[id number] - [place] - [phone]

## 2024-06-26 ENCOUNTER — RX RENEWAL (OUTPATIENT)
Age: 64
End: 2024-06-26

## 2024-06-26 RX ORDER — PANTOPRAZOLE 40 MG/1
40 TABLET, DELAYED RELEASE ORAL
Qty: 30 | Refills: 0 | Status: ACTIVE | COMMUNITY
Start: 2023-06-28 | End: 1900-01-01

## 2024-08-26 ENCOUNTER — NON-APPOINTMENT (OUTPATIENT)
Age: 64
End: 2024-08-26

## 2024-08-28 ENCOUNTER — APPOINTMENT (OUTPATIENT)
Dept: SPINE | Facility: CLINIC | Age: 64
End: 2024-08-28
Payer: COMMERCIAL

## 2024-08-28 ENCOUNTER — NON-APPOINTMENT (OUTPATIENT)
Age: 64
End: 2024-08-28

## 2024-08-28 VITALS
HEIGHT: 58 IN | WEIGHT: 136 LBS | OXYGEN SATURATION: 97 % | DIASTOLIC BLOOD PRESSURE: 84 MMHG | SYSTOLIC BLOOD PRESSURE: 119 MMHG | BODY MASS INDEX: 28.55 KG/M2 | HEART RATE: 100 BPM

## 2024-08-28 DIAGNOSIS — M41.9 SCOLIOSIS, UNSPECIFIED: ICD-10-CM

## 2024-08-28 PROCEDURE — 99204 OFFICE O/P NEW MOD 45 MIN: CPT

## 2024-09-04 ENCOUNTER — OUTPATIENT (OUTPATIENT)
Dept: OUTPATIENT SERVICES | Facility: HOSPITAL | Age: 64
LOS: 1 days | End: 2024-09-04
Payer: COMMERCIAL

## 2024-09-04 ENCOUNTER — APPOINTMENT (OUTPATIENT)
Dept: RADIOLOGY | Facility: CLINIC | Age: 64
End: 2024-09-04

## 2024-09-04 DIAGNOSIS — Z98.890 OTHER SPECIFIED POSTPROCEDURAL STATES: Chronic | ICD-10-CM

## 2024-09-04 DIAGNOSIS — Z90.710 ACQUIRED ABSENCE OF BOTH CERVIX AND UTERUS: Chronic | ICD-10-CM

## 2024-09-04 DIAGNOSIS — Z00.8 ENCOUNTER FOR OTHER GENERAL EXAMINATION: ICD-10-CM

## 2024-09-04 DIAGNOSIS — M41.9 SCOLIOSIS, UNSPECIFIED: ICD-10-CM

## 2024-09-04 DIAGNOSIS — Z98.891 HISTORY OF UTERINE SCAR FROM PREVIOUS SURGERY: Chronic | ICD-10-CM

## 2024-09-04 PROCEDURE — 72082 X-RAY EXAM ENTIRE SPI 2/3 VW: CPT

## 2024-09-04 PROCEDURE — 72082 X-RAY EXAM ENTIRE SPI 2/3 VW: CPT | Mod: 26

## 2024-09-10 ENCOUNTER — APPOINTMENT (OUTPATIENT)
Dept: NEUROSURGERY | Facility: CLINIC | Age: 64
End: 2024-09-10
Payer: COMMERCIAL

## 2024-09-10 DIAGNOSIS — M81.8 OTHER OSTEOPOROSIS W/OUT CURRENT PATHOLOGICAL FRACTURE: ICD-10-CM

## 2024-09-10 DIAGNOSIS — M41.9 SCOLIOSIS, UNSPECIFIED: ICD-10-CM

## 2024-09-10 PROCEDURE — 99215 OFFICE O/P EST HI 40 MIN: CPT

## 2024-09-10 RX ORDER — MELOXICAM 7.5 MG/1
7.5 TABLET ORAL
Qty: 30 | Refills: 1 | Status: ACTIVE | COMMUNITY
Start: 2024-09-10 | End: 1900-01-01

## 2024-09-12 ENCOUNTER — APPOINTMENT (OUTPATIENT)
Dept: ORTHOPEDIC SURGERY | Facility: CLINIC | Age: 64
End: 2024-09-12
Payer: COMMERCIAL

## 2024-09-12 VITALS — HEIGHT: 58 IN | BODY MASS INDEX: 24.56 KG/M2 | WEIGHT: 117 LBS

## 2024-09-12 DIAGNOSIS — M25.551 PAIN IN RIGHT HIP: ICD-10-CM

## 2024-09-12 DIAGNOSIS — M25.552 PAIN IN RIGHT HIP: ICD-10-CM

## 2024-09-12 PROCEDURE — 99204 OFFICE O/P NEW MOD 45 MIN: CPT | Mod: 25

## 2024-09-12 PROCEDURE — 73523 X-RAY EXAM HIPS BI 5/> VIEWS: CPT

## 2024-09-12 NOTE — REASON FOR VISIT
[Initial Visit] : an initial visit for [Hip Pain] : hip pain [Radiculopathy] : radiculopathy [Spouse] : spouse

## 2024-09-12 NOTE — HISTORY OF PRESENT ILLNESS
[de-identified] : This is very nice 64-year-old female experiencing bilateral buttock right more than left pain, which is severe in intensity.  Known history of severe lumbar scoliosis and degenerative disc disease with pars defect being seen by neurosurgery.  The pain substantially limits activities of daily living. Walking tolerance is reduced.  She uses a cane.  Some pain in the right groin.

## 2024-09-12 NOTE — PHYSICAL EXAM
[de-identified] : Patient is well nourished, well-developed, in no acute distress, with appropriate mood and affect. The patient is oriented to time, place, and person. Respirations are even and unlabored. Gait evaluation does not reveal a limp. There is no inguinal adenopathy.  The right limb is well-perfused and showed 2+ dp/pt pulses, without skin lesions, shows a grossly normal motor and sensory examination. Examination of the hip shows no skin lesions. Hip motion is full and painless from 0-90 degrees extension to flexion, 20 degrees adduction and 20 degrees abduction, and 15 degrees internal and 30 degrees external rotation. FADIR is mildly positive at the extreme and NASH is negative. Stinchfield test is negative. The left limb is well-perfused and showed 2+ dp/pt pulses, without skin lesions, shows a grossly normal motor and sensory examination. Examination of the hip shows no skin lesions. Hip motion is full and painless from 0-90 degrees extension to flexion, 20 degrees adduction and 20 degrees abduction, and 15 degrees internal and 30 degrees external rotation. FADIR is negative and NASH is negative. Stinchfield test is negative.  Leg lengths are approximately equal. Both hips are stable and muscle strength is normal with good strength with resisted abduction and adduction. Pedal pulses are palpable.  [de-identified] :  AP pelvis, AP and lateral hip radiographs of the bilateral hip were ordered and taken in the office and demonstrate no evidence of degenerative joint disease of the hip with maintained joint space and no evidence of fractures or other intraarticular pathology.  The lower levels of the lumbar spine are seen and demonstrate a severe levoscoliosis deformity.

## 2024-09-12 NOTE — DISCUSSION/SUMMARY
[de-identified] :  This patient has bilateral hip pain right more than left which appears to be mainly extra-articular although there is some pain reproduced with right hip internal rotation.  The patient is not an appropriate candidate for surgical intervention at this time. An extensive discussion was conducted on the natural history of the disease and the variety of surgical and non-surgical options available to the patient including, but not limited to non-steroidal anti-inflammatory medications, steroid injections, physical therapy, maintenance of ideal body weight, and reduction of activity.  She will continue with epidural steroid injections to see if that helps her.  The patient will be sent for a MRI of the right hip to evaluate for labral tear. They will notify me when the MRI is complete and we will arrange for either an in person or telehealth virtual visit to review the results as well as any next steps in the plan.  She has a prescription for meloxicam in the home and will take this medication.  She does not need a new prescription for that today.  She will continue to use a cane. The patient will schedule an appointment as needed.

## 2024-09-15 NOTE — PHYSICAL EXAM
[General Appearance - Alert] : alert [General Appearance - In No Acute Distress] : in no acute distress [Oriented To Time, Place, And Person] : oriented to person, place, and time [No Visual Abnormalities] : no visible abnormailities [Normal] : normal [Able to toe walk] : the patient was able to toe walk [Able to heel walk] : the patient was able to heel walk [] : no respiratory distress [Heart Rate And Rhythm] : heart rate was normal and rhythm regular [Abnormal Walk] : normal gait

## 2024-09-15 NOTE — RESULTS/DATA
[FreeTextEntry1] :   Office Location: [address] [place] [address], [facility] Office Phone: [phone] Office Fax: [phone] Patient Name: Ariana Green Patient Phone Number: [phone] Patient ID: [id number] : 1960 Date of Exam: 2024 R. Phys. Name: Nic Baeza A R. Phys. Address: [address] [place] [address], [address], [facility] R. Phys. Phone: [phone] MRI-LUMBAR SPINE NON CONTRAST  Clinical indication: M54.41 Lower back pain with right sciatica M79.604 Right Leg Pain M62.830 Spasm of back muscles  Additional patient history: lbp  Technique: MRI of the lumbar spine was performed utilizing standard multiplanar, multisequence image acquisition.  Comparison study: MRI 3/16/2021.  Findings:  Five lumbar type vertebral bodies are presumed for the purposes of this report with the last formed intervertebral disc considered L5-S1. There is a rotatory 51 degrees levoscoliosis of the lumbar spine with left lateral listhesis of L2 on L3 and L3 on L4. Lumbar lordosis appears maintained with grade 1 anterolisthesis of L5 on S1. There is at least a unilateral pars defect at L5 on the right. The vertebral bodies are normal in height without evidence for fracture or destructive osseous lesion. Multilevel degenerative disc disease is appreciated with multilevel disc height loss and desiccation. The conus the size of the top of L2. The retroperitoneum is unremarkable.  Axial levels:  T12-L1: There is no disc herniation, central canal stenosis, or neural foraminal narrowing.  L1-L2: There is no disc herniation, central canal stenosis, or neural foraminal narrowing.  L2-L3: There is a diffuse disc bulge and bilateral facet arthrosis. There is moderate right and mild left neural foraminal narrowing. There is no central canal stenosis.  L3-L4: There is a diffuse disc bulge and bilateral facet arthrosis. There is severe left neural foraminal narrowing with possible left L3 nerve root compromise. The right neural foramen is moderately narrowed and there is moderate central canal left lateral recess stenosis.  L4-L5: There is a diffuse disc bulge and bilateral facet arthrosis. There is severe left neural foraminal stenosis with possible left L4 nerve root compromise. Right neural foramen is mildly narrowed and there is no central canal stenosis.  L5-S1: There is unroofing of the posterior intervertebral disc with bilateral facet arthropathy and severe left neural foraminal stenosis. The right neural foramen is patent and there is no central canal stenosis.  IMPRESSION:   * Scoliosis and multilevel degenerative disc disease and facet arthrosis. * Grade 1 anterolisthesis of L5 on S1 with at least a unilateral pars defect at L5 on the right. * Severe left neural foraminal stenosis at L3-L4, L4-L5, and L5-S1 with possible left L3, L4, and L5 nerve root compromise. * Moderate central canal and left lateral recess stenosis at L3-L4.  Signed by: Ezekiel Lucio MD Signed Date: 8/15/2024 9:39 AM EDT    SIGNED BY: Ezekiel Lucio M.D., Ext. [phone] 08/15/2024 09:39 AM  IMPRESSION:   *

## 2024-09-15 NOTE — DATA REVIEWED
[de-identified] : Cervical Spine: Multilevel disc degenerative changes with reversal of the cervical lordosis. No spinal canal stenosis or high-grade foraminal stenoses.

## 2024-09-15 NOTE — HISTORY OF PRESENT ILLNESS
[FreeTextEntry1] : lowerback pain and hip  [de-identified] : Ms. Ariana Green, a 64-year-old woman with a medical history of childhood scoliosis, hypertension, and gastroesophageal reflux disease (GERD), has presented for a comprehensive neurosurgical evaluation of lumbar sacral spine scoliosis at the request of Dr. Mauri Lugo.  Today, she reports that her symptoms began about 4 weeks ago in the context of her scoliosis. She describes experiencing lower back and hip pain, and denies any gross motor weakness. She is here to discuss the next steps in her treatment.

## 2024-09-15 NOTE — DATA REVIEWED
[de-identified] : Cervical Spine: Multilevel disc degenerative changes with reversal of the cervical lordosis. No spinal canal stenosis or high-grade foraminal stenoses.

## 2024-09-15 NOTE — HISTORY OF PRESENT ILLNESS
[FreeTextEntry1] : lowerback pain and hip  [de-identified] : Ms. Ariana Green, a 64-year-old woman with a medical history of childhood scoliosis, hypertension, and gastroesophageal reflux disease (GERD), has presented for a comprehensive neurosurgical evaluation of lumbar sacral spine scoliosis at the request of Dr. Mauri Lugo.  Today, she reports that her symptoms began about 4 weeks ago in the context of her scoliosis. She describes experiencing lower back and hip pain, and denies any gross motor weakness. She is here to discuss the next steps in her treatment.

## 2024-09-15 NOTE — RESULTS/DATA
[FreeTextEntry1] :   Office Location: [address] [place] [address], [facility] Office Phone: [phone] Office Fax: [phone] Patient Name: Ariana Green Patient Phone Number: [phone] Patient ID: [id number] : 1960 Date of Exam: 2024 R. Phys. Name: Nic Baeza A R. Phys. Address: [address] [place] [address], [address], [facility] R. Phys. Phone: [phone] MRI-LUMBAR SPINE NON CONTRAST  Clinical indication: M54.41 Lower back pain with right sciatica M79.604 Right Leg Pain M62.830 Spasm of back muscles  Additional patient history: lbp  Technique: MRI of the lumbar spine was performed utilizing standard multiplanar, multisequence image acquisition.  Comparison study: MRI 3/16/2021.  Findings:  Five lumbar type vertebral bodies are presumed for the purposes of this report with the last formed intervertebral disc considered L5-S1. There is a rotatory 51 degrees levoscoliosis of the lumbar spine with left lateral listhesis of L2 on L3 and L3 on L4. Lumbar lordosis appears maintained with grade 1 anterolisthesis of L5 on S1. There is at least a unilateral pars defect at L5 on the right. The vertebral bodies are normal in height without evidence for fracture or destructive osseous lesion. Multilevel degenerative disc disease is appreciated with multilevel disc height loss and desiccation. The conus the size of the top of L2. The retroperitoneum is unremarkable.  Axial levels:  T12-L1: There is no disc herniation, central canal stenosis, or neural foraminal narrowing.  L1-L2: There is no disc herniation, central canal stenosis, or neural foraminal narrowing.  L2-L3: There is a diffuse disc bulge and bilateral facet arthrosis. There is moderate right and mild left neural foraminal narrowing. There is no central canal stenosis.  L3-L4: There is a diffuse disc bulge and bilateral facet arthrosis. There is severe left neural foraminal narrowing with possible left L3 nerve root compromise. The right neural foramen is moderately narrowed and there is moderate central canal left lateral recess stenosis.  L4-L5: There is a diffuse disc bulge and bilateral facet arthrosis. There is severe left neural foraminal stenosis with possible left L4 nerve root compromise. Right neural foramen is mildly narrowed and there is no central canal stenosis.  L5-S1: There is unroofing of the posterior intervertebral disc with bilateral facet arthropathy and severe left neural foraminal stenosis. The right neural foramen is patent and there is no central canal stenosis.  IMPRESSION:   * Scoliosis and multilevel degenerative disc disease and facet arthrosis. * Grade 1 anterolisthesis of L5 on S1 with at least a unilateral pars defect at L5 on the right. * Severe left neural foraminal stenosis at L3-L4, L4-L5, and L5-S1 with possible left L3, L4, and L5 nerve root compromise. * Moderate central canal and left lateral recess stenosis at L3-L4.  Signed by: Ezekiel Lucio MD Signed Date: 8/15/2024 9:39 AM EDT    SIGNED BY: Ezekiel Lucio M.D., Ext. [phone] 08/15/2024 09:39 AM  IMPRESSION:   *

## 2024-09-15 NOTE — ASSESSMENT
[FreeTextEntry1] : Ms. Ariana Green is a 64-year-old woman presenting with a diagnosis of lumbar degenerative disc disease in the setting of hip pain.  Imaging and diagnostic workup evaluation revealed the following:  - Scoliosis and multilevel degenerative disc disease and facet arthrosis. - Grade 1 anterolisthesis of L5 on S1 with at least a unilateral pars defect at L5 on the right. - Severe left neural foraminal stenosis at L3-L4, L4-L5, and L5-S1 with possible left L3, L4, and L5 nerve root compromise. - Moderate central canal and left lateral recess stenosis at L3-L4.  The plan is as follows: - Follow up with an orthopedic surgeon for hip pain. - Follow up with endocrinology for evaluation of osteoporosis. - Begin Melixicam. Explanation of medication purpose, action, possible interactions, side effects, and adverse effects should be provided to the patient. - Implement the Teachback Protocol.  Follow up in one year with scoliosis x-ray.  Additionally, please refer to Dr. Philip's dictation for further details.  I, Dr. Efrain Philip, evaluated the patient with the nurse practitioner, José Oconnor, and established the plan of care. I discussed this patient with the nurse practitioner during the visit and agree with the assessment and plan as written unless noted otherwise.e plan of care. I discussed this patient with the nurse practitioner during the visit and agree with the assessment and plan as written unless noted otherwise.

## 2024-09-17 ENCOUNTER — APPOINTMENT (OUTPATIENT)
Dept: MRI IMAGING | Facility: CLINIC | Age: 64
End: 2024-09-17
Payer: COMMERCIAL

## 2024-09-17 ENCOUNTER — OUTPATIENT (OUTPATIENT)
Dept: OUTPATIENT SERVICES | Facility: HOSPITAL | Age: 64
LOS: 1 days | End: 2024-09-17
Payer: COMMERCIAL

## 2024-09-17 DIAGNOSIS — Z90.710 ACQUIRED ABSENCE OF BOTH CERVIX AND UTERUS: Chronic | ICD-10-CM

## 2024-09-17 DIAGNOSIS — Z98.890 OTHER SPECIFIED POSTPROCEDURAL STATES: Chronic | ICD-10-CM

## 2024-09-17 DIAGNOSIS — M25.551 PAIN IN RIGHT HIP: ICD-10-CM

## 2024-09-17 DIAGNOSIS — Z98.891 HISTORY OF UTERINE SCAR FROM PREVIOUS SURGERY: Chronic | ICD-10-CM

## 2024-09-17 PROCEDURE — 73721 MRI JNT OF LWR EXTRE W/O DYE: CPT

## 2024-09-17 PROCEDURE — 73721 MRI JNT OF LWR EXTRE W/O DYE: CPT | Mod: 26,RT

## 2024-09-23 ENCOUNTER — APPOINTMENT (OUTPATIENT)
Dept: ORTHOPEDIC SURGERY | Facility: CLINIC | Age: 64
End: 2024-09-23
Payer: COMMERCIAL

## 2024-09-23 DIAGNOSIS — M25.552 PAIN IN RIGHT HIP: ICD-10-CM

## 2024-09-23 DIAGNOSIS — M25.551 PAIN IN RIGHT HIP: ICD-10-CM

## 2024-09-23 PROCEDURE — 99442: CPT | Mod: 93

## 2024-10-23 ENCOUNTER — APPOINTMENT (OUTPATIENT)
Dept: ORTHOPEDIC SURGERY | Facility: CLINIC | Age: 64
End: 2024-10-23
Payer: COMMERCIAL

## 2024-10-23 VITALS — HEIGHT: 58 IN | WEIGHT: 117 LBS | BODY MASS INDEX: 24.56 KG/M2

## 2024-10-23 DIAGNOSIS — S73.191A OTHER SPRAIN OF RIGHT HIP, INITIAL ENCOUNTER: ICD-10-CM

## 2024-10-23 PROCEDURE — 99213 OFFICE O/P EST LOW 20 MIN: CPT

## 2024-12-03 ENCOUNTER — APPOINTMENT (OUTPATIENT)
Dept: ENDOCRINOLOGY | Facility: CLINIC | Age: 64
End: 2024-12-03
Payer: COMMERCIAL

## 2024-12-03 VITALS
HEIGHT: 58 IN | OXYGEN SATURATION: 98 % | BODY MASS INDEX: 24.77 KG/M2 | WEIGHT: 118 LBS | SYSTOLIC BLOOD PRESSURE: 118 MMHG | HEART RATE: 107 BPM | DIASTOLIC BLOOD PRESSURE: 78 MMHG

## 2024-12-03 DIAGNOSIS — R73.03 PREDIABETES.: ICD-10-CM

## 2024-12-03 DIAGNOSIS — M41.9 SCOLIOSIS, UNSPECIFIED: ICD-10-CM

## 2024-12-03 DIAGNOSIS — M81.0 AGE-RELATED OSTEOPOROSIS W/OUT CURRENT PATHOLOGICAL FRACTURE: ICD-10-CM

## 2024-12-03 PROCEDURE — 99205 OFFICE O/P NEW HI 60 MIN: CPT

## 2024-12-04 PROBLEM — R73.03 PREDIABETES: Status: ACTIVE | Noted: 2024-12-04

## 2024-12-04 LAB
25(OH)D3 SERPL-MCNC: 47.7 NG/ML
ALBUMIN SERPL ELPH-MCNC: 4.7 G/DL
ALP BLD-CCNC: 45 U/L
ALT SERPL-CCNC: 10 U/L
ANION GAP SERPL CALC-SCNC: 17 MMOL/L
AST SERPL-CCNC: 17 U/L
BILIRUB SERPL-MCNC: 0.5 MG/DL
BUN SERPL-MCNC: 9 MG/DL
CALCIUM SERPL-MCNC: 9.9 MG/DL
CALCIUM SERPL-MCNC: 9.9 MG/DL
CHLORIDE SERPL-SCNC: 101 MMOL/L
CO2 SERPL-SCNC: 25 MMOL/L
CREAT SERPL-MCNC: 0.55 MG/DL
EGFR: 103 ML/MIN/1.73M2
GLUCOSE SERPL-MCNC: 160 MG/DL
PARATHYROID HORMONE INTACT: 26 PG/ML
PHOSPHATE SERPL-MCNC: 3.1 MG/DL
POTASSIUM SERPL-SCNC: 3.6 MMOL/L
PROT SERPL-MCNC: 7.3 G/DL
SODIUM SERPL-SCNC: 143 MMOL/L

## 2024-12-07 LAB — PROPEPTIDE TYPE I COLLAGEN: 8.2 NG/ML

## 2024-12-11 LAB — COLLAGEN CTX SERPL-MCNC: 46 PG/ML

## 2024-12-18 ENCOUNTER — APPOINTMENT (OUTPATIENT)
Dept: ENDOCRINOLOGY | Facility: CLINIC | Age: 64
End: 2024-12-18
Payer: COMMERCIAL

## 2024-12-18 DIAGNOSIS — M81.0 AGE-RELATED OSTEOPOROSIS W/OUT CURRENT PATHOLOGICAL FRACTURE: ICD-10-CM

## 2024-12-18 PROCEDURE — 96372 THER/PROPH/DIAG INJ SC/IM: CPT

## 2024-12-18 RX ADMIN — ROMOSOZUMAB-AQQG 0 MG/1.17ML: 105 INJECTION, SOLUTION SUBCUTANEOUS at 00:00

## 2024-12-19 RX ORDER — ROMOSOZUMAB-AQQG 105 MG/1.17ML
105 INJECTION, SOLUTION SUBCUTANEOUS
Refills: 0 | Status: COMPLETED | OUTPATIENT
Start: 2024-12-18

## 2025-01-01 ENCOUNTER — INPATIENT (INPATIENT)
Facility: HOSPITAL | Age: 65
LOS: 3 days | Discharge: HOME | DRG: 720 | End: 2025-01-05
Attending: STUDENT IN AN ORGANIZED HEALTH CARE EDUCATION/TRAINING PROGRAM | Admitting: SURGERY
Payer: COMMERCIAL

## 2025-01-01 VITALS — HEIGHT: 58 IN

## 2025-01-01 DIAGNOSIS — J96.01 ACUTE RESPIRATORY FAILURE WITH HYPOXIA: ICD-10-CM

## 2025-01-01 DIAGNOSIS — Z98.890 OTHER SPECIFIED POSTPROCEDURAL STATES: Chronic | ICD-10-CM

## 2025-01-01 DIAGNOSIS — Z90.710 ACQUIRED ABSENCE OF BOTH CERVIX AND UTERUS: Chronic | ICD-10-CM

## 2025-01-01 DIAGNOSIS — Z98.891 HISTORY OF UTERINE SCAR FROM PREVIOUS SURGERY: Chronic | ICD-10-CM

## 2025-01-01 LAB
ADD ON TEST-SPECIMEN IN LAB: SIGNIFICANT CHANGE UP
ADD ON TEST-SPECIMEN IN LAB: SIGNIFICANT CHANGE UP
ALBUMIN SERPL ELPH-MCNC: 2.9 G/DL — LOW (ref 3.3–5)
ALP SERPL-CCNC: 58 U/L — SIGNIFICANT CHANGE UP (ref 40–120)
ALT FLD-CCNC: 27 U/L — SIGNIFICANT CHANGE UP (ref 12–78)
ANION GAP SERPL CALC-SCNC: 9 MMOL/L — SIGNIFICANT CHANGE UP (ref 5–17)
APTT BLD: 36 SEC — HIGH (ref 24.5–35.6)
AST SERPL-CCNC: 42 U/L — HIGH (ref 15–37)
BASOPHILS # BLD AUTO: 0 K/UL — SIGNIFICANT CHANGE UP (ref 0–0.2)
BASOPHILS NFR BLD AUTO: 0 % — SIGNIFICANT CHANGE UP (ref 0–2)
BILIRUB SERPL-MCNC: 0.6 MG/DL — SIGNIFICANT CHANGE UP (ref 0.2–1.2)
BUN SERPL-MCNC: 21 MG/DL — SIGNIFICANT CHANGE UP (ref 7–23)
BURR CELLS BLD QL SMEAR: SLIGHT — SIGNIFICANT CHANGE UP
CALCIUM SERPL-MCNC: 7.8 MG/DL — LOW (ref 8.5–10.1)
CHLORIDE SERPL-SCNC: 100 MMOL/L — SIGNIFICANT CHANGE UP (ref 96–108)
CO2 SERPL-SCNC: 22 MMOL/L — SIGNIFICANT CHANGE UP (ref 22–31)
CREAT SERPL-MCNC: 0.77 MG/DL — SIGNIFICANT CHANGE UP (ref 0.5–1.3)
DACRYOCYTES BLD QL SMEAR: SLIGHT — SIGNIFICANT CHANGE UP
EGFR: 87 ML/MIN/1.73M2 — SIGNIFICANT CHANGE UP
ELLIPTOCYTES BLD QL SMEAR: SLIGHT — SIGNIFICANT CHANGE UP
EOSINOPHIL # BLD AUTO: 0 K/UL — SIGNIFICANT CHANGE UP (ref 0–0.5)
EOSINOPHIL NFR BLD AUTO: 0 % — SIGNIFICANT CHANGE UP (ref 0–6)
FLUAV AG NPH QL: DETECTED
FLUBV AG NPH QL: SIGNIFICANT CHANGE UP
GLUCOSE BLDC GLUCOMTR-MCNC: 102 MG/DL — HIGH (ref 70–99)
GLUCOSE SERPL-MCNC: 95 MG/DL — SIGNIFICANT CHANGE UP (ref 70–99)
HCT VFR BLD CALC: 38.3 % — SIGNIFICANT CHANGE UP (ref 34.5–45)
HGB BLD-MCNC: 12.7 G/DL — SIGNIFICANT CHANGE UP (ref 11.5–15.5)
INR BLD: 1.02 RATIO — SIGNIFICANT CHANGE UP (ref 0.85–1.16)
LACTATE SERPL-SCNC: 1.1 MMOL/L — SIGNIFICANT CHANGE UP (ref 0.7–2)
LYMPHOCYTES # BLD AUTO: 0.5 K/UL — LOW (ref 1–3.3)
LYMPHOCYTES # BLD AUTO: 11 % — LOW (ref 13–44)
MAGNESIUM SERPL-MCNC: 1.7 MG/DL — SIGNIFICANT CHANGE UP (ref 1.6–2.6)
MANUAL SMEAR VERIFICATION: SIGNIFICANT CHANGE UP
MCHC RBC-ENTMCNC: 30.2 PG — SIGNIFICANT CHANGE UP (ref 27–34)
MCHC RBC-ENTMCNC: 33.2 G/DL — SIGNIFICANT CHANGE UP (ref 32–36)
MCV RBC AUTO: 91 FL — SIGNIFICANT CHANGE UP (ref 80–100)
METAMYELOCYTES # FLD: 2 % — HIGH (ref 0–0)
MONOCYTES # BLD AUTO: 0.32 K/UL — SIGNIFICANT CHANGE UP (ref 0–0.9)
MONOCYTES NFR BLD AUTO: 7 % — SIGNIFICANT CHANGE UP (ref 2–14)
NEUTROPHILS # BLD AUTO: 3.56 K/UL — SIGNIFICANT CHANGE UP (ref 1.8–7.4)
NEUTROPHILS NFR BLD AUTO: 71 % — SIGNIFICANT CHANGE UP (ref 43–77)
NEUTS BAND # BLD: 7 % — SIGNIFICANT CHANGE UP (ref 0–8)
NRBC # BLD: 0 /100 WBCS — SIGNIFICANT CHANGE UP (ref 0–0)
NRBC # BLD: SIGNIFICANT CHANGE UP /100 WBCS (ref 0–0)
PHOSPHATE SERPL-MCNC: 2.1 MG/DL — LOW (ref 2.5–4.5)
PLAT MORPH BLD: NORMAL — SIGNIFICANT CHANGE UP
PLATELET # BLD AUTO: 129 K/UL — LOW (ref 150–400)
POTASSIUM SERPL-MCNC: 2.6 MMOL/L — CRITICAL LOW (ref 3.5–5.3)
POTASSIUM SERPL-SCNC: 2.6 MMOL/L — CRITICAL LOW (ref 3.5–5.3)
PROT SERPL-MCNC: 7.4 GM/DL — SIGNIFICANT CHANGE UP (ref 6–8.3)
PROTHROM AB SERPL-ACNC: 12 SEC — SIGNIFICANT CHANGE UP (ref 9.9–13.4)
RBC # BLD: 4.21 M/UL — SIGNIFICANT CHANGE UP (ref 3.8–5.2)
RBC # FLD: 12.4 % — SIGNIFICANT CHANGE UP (ref 10.3–14.5)
RBC BLD AUTO: ABNORMAL
RSV RNA NPH QL NAA+NON-PROBE: SIGNIFICANT CHANGE UP
SARS-COV-2 RNA SPEC QL NAA+PROBE: SIGNIFICANT CHANGE UP
SODIUM SERPL-SCNC: 131 MMOL/L — LOW (ref 135–145)
VARIANT LYMPHS # BLD: 2 % — SIGNIFICANT CHANGE UP (ref 0–6)
WBC # BLD: 4.57 K/UL — SIGNIFICANT CHANGE UP (ref 3.8–10.5)
WBC # FLD AUTO: 4.57 K/UL — SIGNIFICANT CHANGE UP (ref 3.8–10.5)

## 2025-01-01 PROCEDURE — 71275 CT ANGIOGRAPHY CHEST: CPT | Mod: 26,MC

## 2025-01-01 PROCEDURE — 83036 HEMOGLOBIN GLYCOSYLATED A1C: CPT

## 2025-01-01 PROCEDURE — 84145 PROCALCITONIN (PCT): CPT

## 2025-01-01 PROCEDURE — 74177 CT ABD & PELVIS W/CONTRAST: CPT | Mod: 26,MC

## 2025-01-01 PROCEDURE — 87324 CLOSTRIDIUM AG IA: CPT

## 2025-01-01 PROCEDURE — 85027 COMPLETE CBC AUTOMATED: CPT

## 2025-01-01 PROCEDURE — 87507 IADNA-DNA/RNA PROBE TQ 12-25: CPT

## 2025-01-01 PROCEDURE — 80048 BASIC METABOLIC PNL TOTAL CA: CPT

## 2025-01-01 PROCEDURE — 83735 ASSAY OF MAGNESIUM: CPT

## 2025-01-01 PROCEDURE — 87899 AGENT NOS ASSAY W/OPTIC: CPT

## 2025-01-01 PROCEDURE — 97162 PT EVAL MOD COMPLEX 30 MIN: CPT | Mod: GP

## 2025-01-01 PROCEDURE — 93010 ELECTROCARDIOGRAM REPORT: CPT

## 2025-01-01 PROCEDURE — 80053 COMPREHEN METABOLIC PANEL: CPT

## 2025-01-01 PROCEDURE — 93005 ELECTROCARDIOGRAM TRACING: CPT

## 2025-01-01 PROCEDURE — 85025 COMPLETE CBC W/AUTO DIFF WBC: CPT

## 2025-01-01 PROCEDURE — 87640 STAPH A DNA AMP PROBE: CPT

## 2025-01-01 PROCEDURE — 84484 ASSAY OF TROPONIN QUANT: CPT

## 2025-01-01 PROCEDURE — 36415 COLL VENOUS BLD VENIPUNCTURE: CPT

## 2025-01-01 PROCEDURE — 87449 NOS EACH ORGANISM AG IA: CPT

## 2025-01-01 PROCEDURE — 84100 ASSAY OF PHOSPHORUS: CPT

## 2025-01-01 PROCEDURE — 82962 GLUCOSE BLOOD TEST: CPT

## 2025-01-01 PROCEDURE — 87641 MR-STAPH DNA AMP PROBE: CPT

## 2025-01-01 PROCEDURE — 99291 CRITICAL CARE FIRST HOUR: CPT

## 2025-01-01 RX ORDER — ACETAMINOPHEN 80 MG/.8ML
650 SOLUTION/ DROPS ORAL EVERY 6 HOURS
Refills: 0 | Status: DISCONTINUED | OUTPATIENT
Start: 2025-01-01 | End: 2025-01-05

## 2025-01-01 RX ORDER — EZETIMIBE 10 MG/1
10 TABLET ORAL DAILY
Refills: 0 | Status: DISCONTINUED | OUTPATIENT
Start: 2025-01-01 | End: 2025-01-05

## 2025-01-01 RX ORDER — DEXTROSE MONOHYDRATE 25 G/50ML
12.5 INJECTION, SOLUTION INTRAVENOUS ONCE
Refills: 0 | Status: DISCONTINUED | OUTPATIENT
Start: 2025-01-01 | End: 2025-01-05

## 2025-01-01 RX ORDER — AZITHROMYCIN MONOHYDRATE 200 MG/5ML
500 POWDER, FOR SUSPENSION ORAL ONCE
Refills: 0 | Status: COMPLETED | OUTPATIENT
Start: 2025-01-01 | End: 2025-01-01

## 2025-01-01 RX ORDER — DEXTROSE MONOHYDRATE 25 G/50ML
15 INJECTION, SOLUTION INTRAVENOUS ONCE
Refills: 0 | Status: DISCONTINUED | OUTPATIENT
Start: 2025-01-01 | End: 2025-01-05

## 2025-01-01 RX ORDER — CEFTRIAXONE SODIUM 1 G/1
1000 INJECTION, POWDER, FOR SOLUTION INTRAMUSCULAR; INTRAVENOUS EVERY 24 HOURS
Refills: 0 | Status: DISCONTINUED | OUTPATIENT
Start: 2025-01-01 | End: 2025-01-01

## 2025-01-01 RX ORDER — MAG HYDROX/ALUMINUM HYD/SIMETH 200-200-20
30 SUSPENSION, ORAL (FINAL DOSE FORM) ORAL EVERY 4 HOURS
Refills: 0 | Status: DISCONTINUED | OUTPATIENT
Start: 2025-01-01 | End: 2025-01-05

## 2025-01-01 RX ORDER — GLUCAGON INJECTION, SOLUTION 0.5 MG/.1ML
1 INJECTION, SOLUTION SUBCUTANEOUS ONCE
Refills: 0 | Status: DISCONTINUED | OUTPATIENT
Start: 2025-01-01 | End: 2025-01-05

## 2025-01-01 RX ORDER — GINKGO BILOBA 40 MG
3 CAPSULE ORAL AT BEDTIME
Refills: 0 | Status: DISCONTINUED | OUTPATIENT
Start: 2025-01-01 | End: 2025-01-05

## 2025-01-01 RX ORDER — SODIUM CHLORIDE 9 MG/ML
1000 INJECTION, SOLUTION INTRAVENOUS
Refills: 0 | Status: DISCONTINUED | OUTPATIENT
Start: 2025-01-01 | End: 2025-01-05

## 2025-01-01 RX ORDER — CEFTRIAXONE SODIUM 1 G/1
1000 INJECTION, POWDER, FOR SOLUTION INTRAMUSCULAR; INTRAVENOUS EVERY 24 HOURS
Refills: 0 | Status: DISCONTINUED | OUTPATIENT
Start: 2025-01-02 | End: 2025-01-05

## 2025-01-01 RX ORDER — DEXTROSE MONOHYDRATE 25 G/50ML
25 INJECTION, SOLUTION INTRAVENOUS ONCE
Refills: 0 | Status: DISCONTINUED | OUTPATIENT
Start: 2025-01-01 | End: 2025-01-05

## 2025-01-01 RX ORDER — AZITHROMYCIN MONOHYDRATE 200 MG/5ML
500 POWDER, FOR SUSPENSION ORAL DAILY
Refills: 0 | Status: DISCONTINUED | OUTPATIENT
Start: 2025-01-02 | End: 2025-01-03

## 2025-01-01 RX ORDER — ONDANSETRON 4 MG/1
4 TABLET ORAL EVERY 8 HOURS
Refills: 0 | Status: DISCONTINUED | OUTPATIENT
Start: 2025-01-01 | End: 2025-01-05

## 2025-01-01 RX ORDER — CEFTRIAXONE SODIUM 1 G/1
1000 INJECTION, POWDER, FOR SOLUTION INTRAMUSCULAR; INTRAVENOUS ONCE
Refills: 0 | Status: COMPLETED | OUTPATIENT
Start: 2025-01-01 | End: 2025-01-01

## 2025-01-01 RX ORDER — MAGNESIUM SULFATE 500 MG/ML
1 INJECTION, SOLUTION INTRAMUSCULAR; INTRAVENOUS ONCE
Refills: 0 | Status: COMPLETED | OUTPATIENT
Start: 2025-01-01 | End: 2025-01-01

## 2025-01-01 RX ORDER — CHOLECALCIFEROL (VITAMIN D3) 10 MCG
1 TABLET ORAL
Refills: 0 | DISCHARGE

## 2025-01-01 RX ORDER — PANTOPRAZOLE 40 MG/1
40 TABLET, DELAYED RELEASE ORAL
Refills: 0 | Status: DISCONTINUED | OUTPATIENT
Start: 2025-01-01 | End: 2025-01-05

## 2025-01-01 RX ORDER — INSULIN LISPRO 100/ML
VIAL (ML) SUBCUTANEOUS
Refills: 0 | Status: DISCONTINUED | OUTPATIENT
Start: 2025-01-01 | End: 2025-01-05

## 2025-01-01 RX ORDER — POTASSIUM CHLORIDE 600 MG/1
10 TABLET, FILM COATED, EXTENDED RELEASE ORAL
Refills: 0 | Status: COMPLETED | OUTPATIENT
Start: 2025-01-01 | End: 2025-01-01

## 2025-01-01 RX ORDER — POTASSIUM CHLORIDE 600 MG/1
40 TABLET, FILM COATED, EXTENDED RELEASE ORAL ONCE
Refills: 0 | Status: COMPLETED | OUTPATIENT
Start: 2025-01-01 | End: 2025-01-01

## 2025-01-01 RX ORDER — OSELTAMIVIR 75 MG/1
75 CAPSULE ORAL
Refills: 0 | Status: DISCONTINUED | OUTPATIENT
Start: 2025-01-01 | End: 2025-01-05

## 2025-01-01 RX ORDER — ROSUVASTATIN 40 MG/1
20 TABLET, FILM COATED ORAL AT BEDTIME
Refills: 0 | Status: DISCONTINUED | OUTPATIENT
Start: 2025-01-01 | End: 2025-01-05

## 2025-01-01 RX ORDER — PANTOPRAZOLE 40 MG/1
1 TABLET, DELAYED RELEASE ORAL
Refills: 0 | DISCHARGE

## 2025-01-01 RX ORDER — GUAIFENESIN/DEXTROMETHORPHAN 200-10MG/5
10 LIQUID (ML) ORAL EVERY 4 HOURS
Refills: 0 | Status: DISCONTINUED | OUTPATIENT
Start: 2025-01-01 | End: 2025-01-02

## 2025-01-01 RX ORDER — CHLORHEXIDINE GLUCONATE 1.2 MG/ML
1 RINSE ORAL
Refills: 0 | Status: DISCONTINUED | OUTPATIENT
Start: 2025-01-01 | End: 2025-01-03

## 2025-01-01 RX ORDER — BENZONATATE 100 MG
100 CAPSULE ORAL ONCE
Refills: 0 | Status: COMPLETED | OUTPATIENT
Start: 2025-01-01 | End: 2025-01-01

## 2025-01-01 RX ORDER — METFORMIN 850 MG/1
1 TABLET ORAL
Refills: 0 | DISCHARGE

## 2025-01-01 RX ORDER — SODIUM CHLORIDE 9 MG/ML
1600 INJECTION, SOLUTION INTRAMUSCULAR; INTRAVENOUS; SUBCUTANEOUS ONCE
Refills: 0 | Status: COMPLETED | OUTPATIENT
Start: 2025-01-01 | End: 2025-01-01

## 2025-01-01 RX ORDER — ROSUVASTATIN 40 MG/1
1 TABLET, FILM COATED ORAL
Refills: 0 | DISCHARGE

## 2025-01-01 RX ORDER — RALOXIFENE HYDROCHLORIDE 60 MG/1
60 TABLET ORAL DAILY
Refills: 0 | Status: DISCONTINUED | OUTPATIENT
Start: 2025-01-01 | End: 2025-01-05

## 2025-01-01 RX ORDER — IPRATROPIUM BROMIDE AND ALBUTEROL SULFATE .5; 2.5 MG/3ML; MG/3ML
3 SOLUTION RESPIRATORY (INHALATION) ONCE
Refills: 0 | Status: COMPLETED | OUTPATIENT
Start: 2025-01-01 | End: 2025-01-01

## 2025-01-01 RX ORDER — METOPROLOL TARTRATE 50 MG
1 TABLET ORAL
Refills: 0 | DISCHARGE

## 2025-01-01 RX ADMIN — ROSUVASTATIN 20 MILLIGRAM(S): 40 TABLET, FILM COATED ORAL at 21:57

## 2025-01-01 RX ADMIN — POTASSIUM CHLORIDE 40 MILLIEQUIVALENT(S): 600 TABLET, FILM COATED, EXTENDED RELEASE ORAL at 21:57

## 2025-01-01 RX ADMIN — AZITHROMYCIN MONOHYDRATE 255 MILLIGRAM(S): 200 POWDER, FOR SUSPENSION ORAL at 16:33

## 2025-01-01 RX ADMIN — POTASSIUM CHLORIDE 100 MILLIEQUIVALENT(S): 600 TABLET, FILM COATED, EXTENDED RELEASE ORAL at 14:46

## 2025-01-01 RX ADMIN — POTASSIUM CHLORIDE 100 MILLIEQUIVALENT(S): 600 TABLET, FILM COATED, EXTENDED RELEASE ORAL at 13:42

## 2025-01-01 RX ADMIN — POTASSIUM CHLORIDE 40 MILLIEQUIVALENT(S): 600 TABLET, FILM COATED, EXTENDED RELEASE ORAL at 13:35

## 2025-01-01 RX ADMIN — Medication 100 MILLIGRAM(S): at 13:35

## 2025-01-01 RX ADMIN — IPRATROPIUM BROMIDE AND ALBUTEROL SULFATE 3 MILLILITER(S): .5; 2.5 SOLUTION RESPIRATORY (INHALATION) at 12:43

## 2025-01-01 RX ADMIN — OSELTAMIVIR 75 MILLIGRAM(S): 75 CAPSULE ORAL at 21:58

## 2025-01-01 RX ADMIN — POTASSIUM CHLORIDE 100 MILLIEQUIVALENT(S): 600 TABLET, FILM COATED, EXTENDED RELEASE ORAL at 16:01

## 2025-01-01 RX ADMIN — CEFTRIAXONE SODIUM 1000 MILLIGRAM(S): 1 INJECTION, POWDER, FOR SOLUTION INTRAMUSCULAR; INTRAVENOUS at 16:33

## 2025-01-01 RX ADMIN — MAGNESIUM SULFATE 100 GRAM(S): 500 INJECTION, SOLUTION INTRAMUSCULAR; INTRAVENOUS at 17:33

## 2025-01-01 RX ADMIN — Medication 3 MILLIGRAM(S): at 21:57

## 2025-01-01 RX ADMIN — SODIUM CHLORIDE 1600 MILLILITER(S): 9 INJECTION, SOLUTION INTRAMUSCULAR; INTRAVENOUS; SUBCUTANEOUS at 13:11

## 2025-01-01 NOTE — ED ADULT NURSE REASSESSMENT NOTE - NS ED NURSE REASSESS COMMENT FT1
Upon assessment, pt o2 89% on 6L nc. Pt placed on 60% venti mask w/ no improvement. pt placed on NRB w/ improvement. MD Hernandez aware. Pt stating breathing is increasingly labored when she has to cough and attempts to produce mucus.

## 2025-01-01 NOTE — ED PROVIDER NOTE - PROGRESS NOTE DETAILS
All labs and imaging personally reviewed.  Patient has a mild thrombocytopenia which may be due to viral suppression.  Mildly hyponatremic, potassium 2.6 will add magnesium to labs.  Lactate normal.  Flu a positive.  CT with bilateral groundglass opacities, lower lobe bibasilar consolidations and pleural effusions, likely infectious.  Antibiotics ordered.  Patient was initially de-escalated on NRB but then became persistently hypoxic.  Patient placed on BiPAP with significant improvement.  ICU PA consulted given new BiPAP use, team will evaluate.  Patient to be signed out to Dr. Laird pending ICU evaluation and admission.  Discussed plan of care with patient and  at bedside and all questions answered, they are in agreement with plan. Signout from Dr. Hernandez.  Patient here with malaise nausea vomiting diarrhea also flu positive with low potassium repleted.  Requiring BiPAP for oxygen pending ICU eval.  Per ICU PA patient will go to stepdown but needs to speak with Dr. Lilly first.  Dr. Lilly confirms with me that patient can be admitted to stepdown

## 2025-01-01 NOTE — ED PROVIDER NOTE - NSICDXPASTMEDICALHX_GEN_ALL_CORE_FT
PAST MEDICAL HISTORY:  Chronic sinusitis     GERD (gastroesophageal reflux disease)     Hypercholesterolemia     Obesity     Osteoarthritis     Osteopenia     Torn meniscus left

## 2025-01-01 NOTE — ED PROVIDER NOTE - CLINICAL SUMMARY MEDICAL DECISION MAKING FREE TEXT BOX
64-year-old female presenting with viral type symptoms with GI distress.  Significantly hypoxic on arrival.  Differential including but not limited to: Viral illness, pneumonia, less likely PE, CHF, severe anemia, electrolyte dysfunction.  Will evaluate with blood work, CTA chest with runoff to the abdomen, symptomatic treatment, antibiotics, reassess.  Anticipate admission given degree of hypoxia.

## 2025-01-01 NOTE — H&P ADULT - NSHPPHYSICALEXAM_GEN_ALL_CORE
General: Well appearing, on BiPAP in no significant distress    HEENT: Pupils equal, reactive to light. Symmetric. No scleral icterus or injection.    PULM: B/l rhonchi. Satting 94% on BiPAP, 60% FiO2    NECK: Supple, no lymphadenopathy, trachea midline.    CVS: Regular rate and rhythm    ABD: Soft, nondistended, nontender, normoactive bowel sounds.    EXT: No edema, nontender.    SKIN: Warm and well perfused    NEURO: Alert, oriented, interactive, nonfocal.

## 2025-01-01 NOTE — ED ADULT TRIAGE NOTE - CHIEF COMPLAINT QUOTE
pt presents to the ED for NVD, cough, fever fatigue, shortness of breath since friday. pt 84% on room air, heartrate 116. pt taken for stat ekg and main bed. nkda. no other complaints

## 2025-01-01 NOTE — ED ADULT NURSE NOTE - IN ACCORDANCE WITH [place] LAW, WE OFFER EVERY PATIENT A HEPATITIS C TEST. WOULD YOU LIKE TO BE TESTED TODAY?
Attempted to call patient. No answer, unable to leave a voicemail. Will attempt again later today.    Opt out

## 2025-01-01 NOTE — H&P ADULT - NSHPREVIEWOFSYSTEMS_GEN_ALL_CORE
Review of Systems:    CONSTITUTIONAL: Reported fever, fatigue x several days   EYES: No eye pain, visual disturbances, or discharge.  ENMT:  Chronic difficulty hearing, uses hearing aids.   NECK: No pain or stiffness.  RESPIRATORY: +SOB, cough  CARDIOVASCULAR: No chest pain, palpitations, dizziness, or leg swelling.  GASTROINTESTINAL: +Nausea, vomiting (none today), loose stool  GENITOURINARY: No dysuria, frequency, hematuria, or incontinence.  NEUROLOGICAL: +Headache, no memory loss, loss of strength, numbness, or tremors.  SKIN: No itching, burning, rashes, or lesions.  MUSCULOSKELETAL: No joint pain or swelling  PSYCHIATRIC: No depression, anxiety, mood swings, or difficulty sleeping.

## 2025-01-01 NOTE — ED ADULT NURSE NOTE - OBJECTIVE STATEMENT
pt presents to ED AOx3 from home w/  c/o n/v/d, sob and fevers tmax 101 since friday becoming unbearable today. pt states she was at  and was sent in for O2 saturation of 84%. Pt denies chest pain ,dizziness or blurred vison.   Upon initial assessment, MD Hernandez at bedside. Pt O2 87% on nasal cannula. Verbal order for 1x Duo nebulizer. Verified w/ MD twice.  Pt placed on Nebulizer and then NRB. IVs placed and labs drawn. Denies smoking, COPD or respiratory hx.

## 2025-01-01 NOTE — ED PROVIDER NOTE - PHYSICAL EXAMINATION
GENERAL: A&Ox4, non-toxic appearing, no acute distress  HEENT: NCAT, EOMI, oral mucosa moist, normal conjunctiva  RESP: no respiratory distress, faint crackles at bases, speaking in full sentences, SpO2 78% on room air  CV: RRR, no murmurs/rubs/gallops, no pedal edema, no calf tenderness  ABDOMEN: soft, RLQ TTP, non-distended, no guarding, no rebound tenderness  MSK: no visible deformities  NEURO: no focal sensory or motor deficits, CN 2-12 grossly intact  SKIN: warm, normal color, well perfused, no rash  PSYCH: normal affect

## 2025-01-01 NOTE — ED PROVIDER NOTE - CARE PLAN
Principal Discharge DX:	Acute hypoxic respiratory failure  Secondary Diagnosis:	Pneumonia due to influenza A virus  Secondary Diagnosis:	Hypokalemia   1

## 2025-01-01 NOTE — H&P ADULT - HISTORY OF PRESENT ILLNESS
64-year-old female PMH HLD, osteoarthritis, osteopenia, presented to the emergency department on 1/1/15 for 5 days of URI symptoms, nausea, vomiting, and diarrhea. Today, her SpO2 on her home pulse ox was low, so she went to urgent care and was then sent to the ER.     On arrival, patient found to have SpO2 78% on RA. Patient found to have multifocal PNA, and to be influenza A positive. Patient ultimately started on BiPAP for hypoxia and work of breathing.

## 2025-01-01 NOTE — ED PROVIDER NOTE - OBJECTIVE STATEMENT
64-year-old female PMH HLD, osteoarthritis, osteopenia, presents to the emergency department for generalized malaise.  Patient states she was having URI symptoms few days ago, later developed GI distress including nausea, vomiting, and diarrhea.  She has had a cough with a fever at home and took her own pulse ox which was noted to be low, then proceeded to the urgent care.  Patient was found to be hypoxic there, urgently sent to the ED.  Patient arrives at 78% on room air, immediately started on nasal cannula with some improvement and escalated to NRB.  No recent travel or sick contacts.  No history of DVT/PE.

## 2025-01-01 NOTE — H&P ADULT - NSHPLABSRESULTS_GEN_ALL_CORE
< from: CT Angio Chest PE Protocol w/ IV Cont (01.01.25 @ 12:56) >    IMPRESSION:  No pulmonary embolism through the level of the lobar and segmental   pulmonary arteries. Evaluation of more distal subsegmental pulmonary   arteries is limited by suboptimal contrast bolus timing  Bilateral airway associated groundglass opacities in all 5 lobes. Small   bibasilar consolidations and pleural effusions, likely infectious.  No acute abdominal finding.    --- End of Report ---      < end of copied text >

## 2025-01-01 NOTE — ED PROVIDER NOTE - HIV OFFER
Pediatric BMT Discharge Summary    Discharge Date: 5/31/24    BMT Primary Physician: Dr. Cyrus Sanchez  BMT Nurse Coordinator: Marissa Mckeon RN    Discharge Diagnosis: S/P readmission for nausea/vomiting  Discharge To: [facility]    Home Infusion Company: [facility]  Central Line:   - Central line type: [place]  - Central line cares: Per home infusion vascular access device policy  - Central line dressing change plan: Home infusion nurse to change weekly  - Supplies needed: Central line dressing kits for weekly dressing changes  - Skilled nurse visit needs: Per home infusion    IV Medications through home infusion: Yes - see pharmacist's orders (TPN)    Nutrition:   - Tubes in place: N/A  - Regular diet as tolerated and TPN/IL-see separate orders for formula  - Supplies needed: Per home infusion    
Opt out

## 2025-01-01 NOTE — ED PROVIDER NOTE - NSICDXPASTSURGICALHX_GEN_ALL_CORE_FT
PAST SURGICAL HISTORY:  H/O  section ,     H/O: hysterectomy b/l salpingectomy, oophorectomy left    S/P left knee arthroscopy ,

## 2025-01-01 NOTE — ED ADULT NURSE REASSESSMENT NOTE - NS ED NURSE REASSESS COMMENT FT1
Pt received from LAWRENCE Sow 14:00. Pt placed on BiPap Spo2 95%. Pt offers no complaints at this time. Safety and comfort measures maintained.

## 2025-01-01 NOTE — H&P ADULT - ASSESSMENT
64-year-old female PMH HLD, osteoarthritis, osteopenia, currently admitted to SICU with acute hypoxic respiratory failure 2/2 multifocal PNA, possible superimposed bacterial PNA on influenza A PNA.   # Multifocal PNA   # Influenza PNA   # Acute hypoxic respiratory failure   # Hypokalemia     Plan:     Dispo: SICU  Neuro: No acute needs     CV: Continue home pilarorsascha     Pulm:   - Acute hypoxic respiratory failure 2/2 multifocal PNA (possible superimposed bacterial PNA on influenza A PNA) requiring BiPAP for increased work of breathing and hypoxia. Actively titrating settings to maintain SpO2 >92%   - Antitussives for symptomatic improvement     GI:   - Consistent carb diet as tolerated   - PPI before breakfast     Renal/lytes:  - Hypokalemic, may be 2/2 several days of diarrhea. Receiving potassium supplementation   - Ordered for IV magnesium   - No other acute needs     ID:  - Influenza A positive, started on Tamiflu due to significant hypoxemia requiring NIPPV and hospitalization   - Empiric abx coverage with ceftriaxone and azithromycin   - Ordered sputum cx  - Avoiding steroids in the setting of influenza PNA unless otherwise clinically indicated     Heme:   - Lovenox for DVT ppx     Endo:   - Patient reports she takes metformin for prediabetes. FS BGL AC/QHS, ISS for glycemic control     Case discussed with Dr. Geller, ICU/SICU attending     Critical Care Time (EXCLUSIVE of any non bundled procedures) :  60 minutes were spent assessing the patient's presenting problems of acute illness that pose a high probability of life threatening  deterioration or end organ damage / dysfunction.  Medical decision making includes initiation / continuation of plan or care review data/ labwork/ radiographic study, direct patient care at bedside, discussions with consultants regarding care, evaluation and interpretation of vital signs, any necessary ventilator management, NIV or BIPAP changes or initiation, discussions with multidisciplinary team,  am or pm rounds, discussions of goals of care with patient and family, all non-inclusive of procedures.    Date of entry of this note is equal to the date of services rendered

## 2025-01-02 LAB
A1C WITH ESTIMATED AVERAGE GLUCOSE RESULT: 6 % — HIGH (ref 4–5.6)
ALBUMIN SERPL ELPH-MCNC: 2.1 G/DL — LOW (ref 3.3–5)
ALP SERPL-CCNC: 55 U/L — SIGNIFICANT CHANGE UP (ref 40–120)
ALT FLD-CCNC: 26 U/L — SIGNIFICANT CHANGE UP (ref 12–78)
ANION GAP SERPL CALC-SCNC: 5 MMOL/L — SIGNIFICANT CHANGE UP (ref 5–17)
ANION GAP SERPL CALC-SCNC: 7 MMOL/L — SIGNIFICANT CHANGE UP (ref 5–17)
AST SERPL-CCNC: 43 U/L — HIGH (ref 15–37)
BASOPHILS # BLD AUTO: 0.02 K/UL — SIGNIFICANT CHANGE UP (ref 0–0.2)
BASOPHILS NFR BLD AUTO: 0.5 % — SIGNIFICANT CHANGE UP (ref 0–2)
BILIRUB SERPL-MCNC: 0.4 MG/DL — SIGNIFICANT CHANGE UP (ref 0.2–1.2)
BUN SERPL-MCNC: 10 MG/DL — SIGNIFICANT CHANGE UP (ref 7–23)
BUN SERPL-MCNC: 10 MG/DL — SIGNIFICANT CHANGE UP (ref 7–23)
C DIFF GDH STL QL: NEGATIVE — SIGNIFICANT CHANGE UP
C DIFF GDH STL QL: SIGNIFICANT CHANGE UP
CALCIUM SERPL-MCNC: 7 MG/DL — LOW (ref 8.5–10.1)
CALCIUM SERPL-MCNC: 7.3 MG/DL — LOW (ref 8.5–10.1)
CHLORIDE SERPL-SCNC: 108 MMOL/L — SIGNIFICANT CHANGE UP (ref 96–108)
CHLORIDE SERPL-SCNC: 109 MMOL/L — HIGH (ref 96–108)
CO2 SERPL-SCNC: 22 MMOL/L — SIGNIFICANT CHANGE UP (ref 22–31)
CO2 SERPL-SCNC: 22 MMOL/L — SIGNIFICANT CHANGE UP (ref 22–31)
CREAT SERPL-MCNC: 0.38 MG/DL — LOW (ref 0.5–1.3)
CREAT SERPL-MCNC: 0.49 MG/DL — LOW (ref 0.5–1.3)
EGFR: 106 ML/MIN/1.73M2 — SIGNIFICANT CHANGE UP
EGFR: 113 ML/MIN/1.73M2 — SIGNIFICANT CHANGE UP
EOSINOPHIL # BLD AUTO: 0.01 K/UL — SIGNIFICANT CHANGE UP (ref 0–0.5)
EOSINOPHIL NFR BLD AUTO: 0.2 % — SIGNIFICANT CHANGE UP (ref 0–6)
ESTIMATED AVERAGE GLUCOSE: 126 MG/DL — HIGH (ref 68–114)
GLUCOSE BLDC GLUCOMTR-MCNC: 104 MG/DL — HIGH (ref 70–99)
GLUCOSE BLDC GLUCOMTR-MCNC: 87 MG/DL — SIGNIFICANT CHANGE UP (ref 70–99)
GLUCOSE SERPL-MCNC: 142 MG/DL — HIGH (ref 70–99)
GLUCOSE SERPL-MCNC: 88 MG/DL — SIGNIFICANT CHANGE UP (ref 70–99)
HCT VFR BLD CALC: 31.2 % — LOW (ref 34.5–45)
HCT VFR BLD CALC: 31.7 % — LOW (ref 34.5–45)
HGB BLD-MCNC: 10.2 G/DL — LOW (ref 11.5–15.5)
HGB BLD-MCNC: 10.3 G/DL — LOW (ref 11.5–15.5)
IMM GRANULOCYTES NFR BLD AUTO: 0.9 % — SIGNIFICANT CHANGE UP (ref 0–0.9)
LEGIONELLA AG UR QL: NEGATIVE — SIGNIFICANT CHANGE UP
LYMPHOCYTES # BLD AUTO: 0.84 K/UL — LOW (ref 1–3.3)
LYMPHOCYTES # BLD AUTO: 19.1 % — SIGNIFICANT CHANGE UP (ref 13–44)
MAGNESIUM SERPL-MCNC: 1.9 MG/DL — SIGNIFICANT CHANGE UP (ref 1.6–2.6)
MCHC RBC-ENTMCNC: 30 PG — SIGNIFICANT CHANGE UP (ref 27–34)
MCHC RBC-ENTMCNC: 30.4 PG — SIGNIFICANT CHANGE UP (ref 27–34)
MCHC RBC-ENTMCNC: 32.5 G/DL — SIGNIFICANT CHANGE UP (ref 32–36)
MCHC RBC-ENTMCNC: 32.7 G/DL — SIGNIFICANT CHANGE UP (ref 32–36)
MCV RBC AUTO: 91.8 FL — SIGNIFICANT CHANGE UP (ref 80–100)
MCV RBC AUTO: 93.5 FL — SIGNIFICANT CHANGE UP (ref 80–100)
MONOCYTES # BLD AUTO: 0.34 K/UL — SIGNIFICANT CHANGE UP (ref 0–0.9)
MONOCYTES NFR BLD AUTO: 7.7 % — SIGNIFICANT CHANGE UP (ref 2–14)
MRSA PCR RESULT.: SIGNIFICANT CHANGE UP
NEUTROPHILS # BLD AUTO: 3.14 K/UL — SIGNIFICANT CHANGE UP (ref 1.8–7.4)
NEUTROPHILS NFR BLD AUTO: 71.6 % — SIGNIFICANT CHANGE UP (ref 43–77)
PHOSPHATE SERPL-MCNC: 1.2 MG/DL — LOW (ref 2.5–4.5)
PHOSPHATE SERPL-MCNC: 2.8 MG/DL — SIGNIFICANT CHANGE UP (ref 2.5–4.5)
PLATELET # BLD AUTO: 116 K/UL — LOW (ref 150–400)
PLATELET # BLD AUTO: 121 K/UL — LOW (ref 150–400)
POTASSIUM SERPL-MCNC: 3.5 MMOL/L — SIGNIFICANT CHANGE UP (ref 3.5–5.3)
POTASSIUM SERPL-MCNC: 3.5 MMOL/L — SIGNIFICANT CHANGE UP (ref 3.5–5.3)
POTASSIUM SERPL-SCNC: 3.5 MMOL/L — SIGNIFICANT CHANGE UP (ref 3.5–5.3)
POTASSIUM SERPL-SCNC: 3.5 MMOL/L — SIGNIFICANT CHANGE UP (ref 3.5–5.3)
PROCALCITONIN SERPL-MCNC: 2.07 NG/ML — HIGH (ref 0.02–0.1)
PROT SERPL-MCNC: 5.6 GM/DL — LOW (ref 6–8.3)
RBC # BLD: 3.39 M/UL — LOW (ref 3.8–5.2)
RBC # BLD: 3.4 M/UL — LOW (ref 3.8–5.2)
RBC # FLD: 12.5 % — SIGNIFICANT CHANGE UP (ref 10.3–14.5)
RBC # FLD: 12.6 % — SIGNIFICANT CHANGE UP (ref 10.3–14.5)
S AUREUS DNA NOSE QL NAA+PROBE: SIGNIFICANT CHANGE UP
S PNEUM AG UR QL: POSITIVE
SODIUM SERPL-SCNC: 135 MMOL/L — SIGNIFICANT CHANGE UP (ref 135–145)
SODIUM SERPL-SCNC: 138 MMOL/L — SIGNIFICANT CHANGE UP (ref 135–145)
TROPONIN I, HIGH SENSITIVITY RESULT: 7.16 NG/L — SIGNIFICANT CHANGE UP
WBC # BLD: 4.04 K/UL — SIGNIFICANT CHANGE UP (ref 3.8–10.5)
WBC # BLD: 4.39 K/UL — SIGNIFICANT CHANGE UP (ref 3.8–10.5)
WBC # FLD AUTO: 4.04 K/UL — SIGNIFICANT CHANGE UP (ref 3.8–10.5)
WBC # FLD AUTO: 4.39 K/UL — SIGNIFICANT CHANGE UP (ref 3.8–10.5)

## 2025-01-02 PROCEDURE — 99232 SBSQ HOSP IP/OBS MODERATE 35: CPT

## 2025-01-02 PROCEDURE — 93010 ELECTROCARDIOGRAM REPORT: CPT

## 2025-01-02 RX ORDER — ENOXAPARIN SODIUM 60 MG/.6ML
40 INJECTION INTRAVENOUS; SUBCUTANEOUS EVERY 24 HOURS
Refills: 0 | Status: DISCONTINUED | OUTPATIENT
Start: 2025-01-02 | End: 2025-01-05

## 2025-01-02 RX ORDER — SOD PHOS DI, MONO/K PHOS MONO 250 MG
2 TABLET ORAL ONCE
Refills: 0 | Status: COMPLETED | OUTPATIENT
Start: 2025-01-02 | End: 2025-01-02

## 2025-01-02 RX ORDER — GUAIFENESIN 100 MG/5ML
600 SYRUP ORAL EVERY 12 HOURS
Refills: 0 | Status: DISCONTINUED | OUTPATIENT
Start: 2025-01-02 | End: 2025-01-05

## 2025-01-02 RX ORDER — BENZOCAINE AND MENTHOL 15; 3.6 MG/1; MG/1
1 LOZENGE ORAL EVERY 4 HOURS
Refills: 0 | Status: DISCONTINUED | OUTPATIENT
Start: 2025-01-02 | End: 2025-01-05

## 2025-01-02 RX ORDER — SODIUM CHLORIDE 0.65 %
1 AEROSOL, SPRAY (ML) NASAL
Refills: 0 | Status: DISCONTINUED | OUTPATIENT
Start: 2025-01-02 | End: 2025-01-05

## 2025-01-02 RX ORDER — CHLORHEXIDINE GLUCONATE 1.2 MG/ML
1 RINSE ORAL
Refills: 0 | Status: DISCONTINUED | OUTPATIENT
Start: 2025-01-02 | End: 2025-01-05

## 2025-01-02 RX ORDER — POTASSIUM PHOSPHATE, MONOBASIC AND POTASSIUM PHOSPHATE, DIBASIC 224; 236 MG/ML; MG/ML
30 INJECTION, SOLUTION INTRAVENOUS ONCE
Refills: 0 | Status: COMPLETED | OUTPATIENT
Start: 2025-01-02 | End: 2025-01-02

## 2025-01-02 RX ADMIN — ENOXAPARIN SODIUM 40 MILLIGRAM(S): 60 INJECTION INTRAVENOUS; SUBCUTANEOUS at 05:40

## 2025-01-02 RX ADMIN — Medication 600 MILLIGRAM(S): at 22:39

## 2025-01-02 RX ADMIN — PANTOPRAZOLE 40 MILLIGRAM(S): 40 TABLET, DELAYED RELEASE ORAL at 05:40

## 2025-01-02 RX ADMIN — CEFTRIAXONE SODIUM 1000 MILLIGRAM(S): 1 INJECTION, POWDER, FOR SOLUTION INTRAMUSCULAR; INTRAVENOUS at 17:22

## 2025-01-02 RX ADMIN — EZETIMIBE 10 MILLIGRAM(S): 10 TABLET ORAL at 11:32

## 2025-01-02 RX ADMIN — AZITHROMYCIN MONOHYDRATE 255 MILLIGRAM(S): 200 POWDER, FOR SUSPENSION ORAL at 15:26

## 2025-01-02 RX ADMIN — OSELTAMIVIR 75 MILLIGRAM(S): 75 CAPSULE ORAL at 21:42

## 2025-01-02 RX ADMIN — ROSUVASTATIN 20 MILLIGRAM(S): 40 TABLET, FILM COATED ORAL at 21:42

## 2025-01-02 RX ADMIN — OSELTAMIVIR 75 MILLIGRAM(S): 75 CAPSULE ORAL at 11:31

## 2025-01-02 RX ADMIN — Medication 600 MILLIGRAM(S): at 11:31

## 2025-01-02 RX ADMIN — RALOXIFENE HYDROCHLORIDE 60 MILLIGRAM(S): 60 TABLET ORAL at 11:30

## 2025-01-02 RX ADMIN — Medication 2 TABLET(S): at 11:30

## 2025-01-02 RX ADMIN — POTASSIUM PHOSPHATE, MONOBASIC AND POTASSIUM PHOSPHATE, DIBASIC 83.33 MILLIMOLE(S): 224; 236 INJECTION, SOLUTION INTRAVENOUS at 11:32

## 2025-01-02 RX ADMIN — Medication 1 SPRAY(S): at 11:37

## 2025-01-02 NOTE — PATIENT PROFILE ADULT - FALL HARM RISK - HARM RISK INTERVENTIONS
Communicate Risk of Fall with Harm to all staff/Reinforce activity limits and safety measures with patient and family/Tailored Fall Risk Interventions/Visual Cue: Yellow wristband and red socks/Bed in lowest position, wheels locked, appropriate side rails in place/Call bell, personal items and telephone in reach/Instruct patient to call for assistance before getting out of bed or chair/Non-slip footwear when patient is out of bed/[place] to call system/Physically safe environment - no spills, clutter or unnecessary equipment/Purposeful Proactive Rounding/Room/bathroom lighting operational, light cord in reach Assistance with ambulation/Assistance OOB with selected safe patient handling equipment/Communicate Risk of Fall with Harm to all staff/Reinforce activity limits and safety measures with patient and family/Tailored Fall Risk Interventions/Visual Cue: Yellow wristband and red socks/Bed in lowest position, wheels locked, appropriate side rails in place/Call bell, personal items and telephone in reach/Instruct patient to call for assistance before getting out of bed or chair/Non-slip footwear when patient is out of bed/[place] to call system/Physically safe environment - no spills, clutter or unnecessary equipment/Purposeful Proactive Rounding/Room/bathroom lighting operational, light cord in reach

## 2025-01-02 NOTE — PROGRESS NOTE ADULT - ASSESSMENT
ASSESSMENT   65yo female PMHx HLD, osteoarthritis, osteopenia, presented to the emergency department on 1/1/15 for 5 days of URI symptoms, nausea, vomiting, and diarrhea. Today, her SpO2 on her home pulse ox was low, so she went to urgent care and was then sent to the ER.     Admitted for   1. Acute hypoxic respiratory failure  2. + Influenza A with superimposed bacterial PNA  3. Hypokalemia HypoPhos  4. Diarrhea    PLAN    Neuro: AAOx 3. pain control prn tylenol  CV: BP stable. in sinus rhythm on monitor. cont statin  Pulm: was on HFNC this am, now on 4L nc stating 94-95%. titrate to maintain O2 sat > 94%.  GI: PO diet as tolerated. check C diff and GI PCR.  Nephro: monitor I & Os. Trend renal fxn. Repleted Phos.   Endo: hold metformin. BGMs ac hs. ISS.   ID: cont IV CTX 1gm qd #2 and IV azithromycin 500mg qd #2. + urine strep. urine legionella neg. cont tamiflu bid #2. . f/u blood, urine cx and sputum cx.   Heme: Hgb drifting down 12.7 - > 10.2, check CBC 16:00. no evidence of bleeding, denies melena, BRBPR. DVT ppx - lovenox sq. SCD  PT eval    Dispo: SICU. IV abx. tamiflu. replete Phos. check stool studies    discussed with Dr. Bartholomew

## 2025-01-02 NOTE — PATIENT PROFILE ADULT - FUNCTIONAL ASSESSMENT - DAILY ACTIVITY ASSESSMENT TYPE
Patient:   NISH CAMARA            MRN: [id number]            FIN: [id number]               Age:   3 months     Sex:  MALE     :  18   Associated Diagnoses:   None   Author:   AUSTIN LECHUGA      OPERATIVE REPORT        PREOPERATIVE DIAGNOSIS:  Right adrenal mass      POSTOPERATIVE DIAGNOSIS:  Same      PROCEDURE:  Laparoscopic right adrenalectomy      SURGEON:  Austin Maria MD      ASSISTANT:  Hyacinth Galindo MD      FINDINGS:  Right adrenal mass    SPECIMENS REMOVED:  Right adrenal gland      ESTIMATED BLOOD LOSS:  2mL      BLOOD ADMINISTATION:  N/a    COMPLICATIONS:  N/a    GRAFTS/IMPLANTS:  N/a    ANESTHESIA:  General endotracheal anesthesia      INDICATIONS:  The patient is a 4-month male with 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 In collaboration with the pediatric hematology-oncology group, we discussed the treatment options for a growing congenital adrenal mass. Given the imaging characteristics and the size of the mass compared to the baby, a laparoscopic approach was discussed with the patient's mother. We discussed the risks and benefits of operative intervention, including infection, bleeding, injury to surrounding structures, and the possibility of removing the adrenal gland for benign pathology. Appropriate informed consent for laparoscopic right adrenalectomy was obtained.      DESCRIPTION OF PROCEDURE:  The patient was brought to the operative suite and placed supine on the operative table. General anesthesia was administered and the patient was successfully intubated without complication. A Bovie pad was placed. A Sewell catheter was placed. Appropriate preoperative antibiotics were administered. The patient was repositioned at the foot of the bed, with adequate padding over all presure points. The patient's abdomen was prepped and draped in the normal standard fashion.    A curvilinear infraumbilical incision was made  in the skin,  and the subcutaneous tissues were dissected down to the umbilical stalk. This was grasped and elevated, and the inferior fascia was opened in the mideline prior to the introduction of a Verress needle. A drop test confirmed intra-peritoneal positioning. A 5mm trocar placed, and the abdomen was gently insufflated without complication. The 5mm 30-degree scope was introduced into the peritoneal cavity. The underlying viscera were noted to be free of injury. Two additional 5mm trocars were placed in the right and left abdominal quadrants, in line with the umbilicus. A final 5mm trocar was placed in the left upper quadrant. All of these trocars were placed after the instillation of 0.25% Marcaine to numb the skin and the peritoneum.    Attention was turned to the right upper quadrant. A 5mm fan liver retractor was introduced via the left upper quadrant trocar to elevate the right jamal-liver. This exposed an enlarged mass in the right retroperitoneum. A Kocher maneuver was performed to expose the IVC and the medial extent of the adrenal gland. The retroperitoneal peritoneum was opened with 5mm EnSeal device, and a combination of EnSeal and blunt dissection was used to define the medial border of the adrenal gland and the mass. A large superior vein was found arising from the inferior vena cava and traversing directly into the adrenal mass. This was doubly clipped on the stay side and singly clipped on the go side, prior to being transected with scissors. The dissection of the adrenal gland and mass continued superiorly and laterally, with small vessels to the adrenal gland managed with the EnSeal device. Once freed from all of its retroperitoneal and vascular attachments, a 5mm EndoCatch bag was used to retrieve the right adrenal gland via the umbilical port site. This was sent immediately to pathology for snap freezing and further evaluation. The adrenal fossa was carefully inspected and adequate hemostasis was assured.  The inferior vena cava and the right renal vein were carefully inspected and noted to be intact.     At this point, pneumoperitoneum was released and a 2-0 Vicryl suture in a figure-of-eight fashion was used to close the midline fascia of the infraumbilical incision. The abdomen was reinsufflated and the midline closure was inspected, and noted to be free of any underlying entrapped viscera. No air leak was noted through the fascia. Pneumoperitoneum was again released. The remaining trocars were removed, and the overlying skin of all the incisions were closed with interrupted, subcuticular 5-0 Monocryl sutures. Dermabond completed the dressing.    At this point, the patient was awoken, extubated, and transferred to the PACU in stable condition, having tolerated the procedure well. All counts were noted to be correct at the end of the case. I, Austin Maria, was present for and performed all critical aspects of this case.      Austin Maria MD, MA            Electronically Signed On 2017 23:16  __________________________________________________   AUSTIN LECHUGA     Admission

## 2025-01-02 NOTE — PROGRESS NOTE ADULT - SUBJECTIVE AND OBJECTIVE BOX
Patient is a 64y old  Female who presents with a chief complaint of Pneumonia (2025 16:48)      BRIEF HOSPITAL COURSE: ***    Events last 24 hours: ***    PAST MEDICAL & SURGICAL HISTORY:  GERD (gastroesophageal reflux disease)      Chronic sinusitis      Hypercholesterolemia      Obesity      Osteoarthritis      Osteopenia      Torn meniscus  left      S/P left knee arthroscopy  ,       H/O: hysterectomy  b/l salpingectomy, oophorectomy left      H/O  section  ,           Review of Systems:  CONSTITUTIONAL: No fever, chills, or fatigue  EYES: No eye pain, visual disturbances, or discharge  ENMT:  No difficulty hearing, tinnitus, vertigo; No sinus or throat pain  NECK: No pain or stiffness  RESPIRATORY: No cough, wheezing, chills or hemoptysis; No shortness of breath  CARDIOVASCULAR: No chest pain, palpitations, dizziness, or leg swelling  GASTROINTESTINAL: No abdominal or epigastric pain. No nausea, vomiting, or hematemesis; No diarrhea or constipation. No melena or hematochezia.  GENITOURINARY: No dysuria, frequency, hematuria, or incontinence  NEUROLOGICAL: No headaches, memory loss, loss of strength, numbness, or tremors  SKIN: No itching, burning, rashes, or lesions   MUSCULOSKELETAL: No joint pain or swelling; No muscle, back, or extremity pain  PSYCHIATRIC: No depression, anxiety, mood swings, or difficulty sleeping      Medications:  azithromycin  IVPB 500 milliGRAM(s) IV Intermittent daily  cefTRIAXone Injectable. 1000 milliGRAM(s) IV Push every 24 hours  oseltamivir 75 milliGRAM(s) Oral two times a day      guaifenesin/dextromethorphan Oral Liquid 10 milliLiter(s) Oral every 4 hours PRN    acetaminophen     Tablet .. 650 milliGRAM(s) Oral every 6 hours PRN  melatonin 3 milliGRAM(s) Oral at bedtime PRN  ondansetron Injectable 4 milliGRAM(s) IV Push every 8 hours PRN    raloxifene 60 milliGRAM(s) Oral daily      aluminum hydroxide/magnesium hydroxide/simethicone Suspension 30 milliLiter(s) Oral every 4 hours PRN  pantoprazole    Tablet 40 milliGRAM(s) Oral before breakfast      dextrose 50% Injectable 25 Gram(s) IV Push once  dextrose 50% Injectable 12.5 Gram(s) IV Push once  dextrose 50% Injectable 25 Gram(s) IV Push once  dextrose Oral Gel 15 Gram(s) Oral once PRN  ezetimibe 10 milliGRAM(s) Oral daily  glucagon  Injectable 1 milliGRAM(s) IntraMuscular once  insulin lispro (ADMELOG) corrective regimen sliding scale   SubCutaneous three times a day before meals  rosuvastatin 20 milliGRAM(s) Oral at bedtime    dextrose 5%. 1000 milliLiter(s) IV Continuous <Continuous>  dextrose 5%. 1000 milliLiter(s) IV Continuous <Continuous>      benzocaine/menthol Lozenge 1 Lozenge Oral every 4 hours PRN  chlorhexidine 2% Cloths 1 Application(s) Topical <User Schedule>            ICU Vital Signs Last 24 Hrs  T(C): 37.1 (2025 17:12), Max: 37.3 (2025 16:07)  T(F): 98.8 (2025 17:12), Max: 99.1 (2025 16:07)  HR: 100 (2025 00:00) (98 - 117)  BP: 112/70 (2025 00:00) (93/59 - 138/75)  BP(mean): 80 (2025 00:00) (69 - 105)  ABP: --  ABP(mean): --  RR: 24 (2025 00:00) (19 - 25)  SpO2: 98% (2025 00:00) (82% - 99%)    O2 Parameters below as of 2025 00:00  Patient On (Oxygen Delivery Method): nasal cannula, high flow  O2 Flow (L/min): 45  O2 Concentration (%): 60                  LABS:                        12.7   4.57  )-----------( 129      ( 2025 12:24 )             38.3         131[L]  |  100  |  21  ----------------------------<  95  2.6[LL]   |  22  |  0.77    Ca    7.8[L]      2025 12:24  Phos  2.1       Mg     1.7         TPro  7.4  /  Alb  2.9[L]  /  TBili  0.6  /  DBili  x   /  AST  42[H]  /  ALT  27  /  AlkPhos  58            CAPILLARY BLOOD GLUCOSE      POCT Blood Glucose.: 102 mg/dL (2025 18:51)    PT/INR - ( 2025 12:24 )   PT: 12.0 sec;   INR: 1.02 ratio         PTT - ( 2025 12:24 )  PTT:36.0 sec  Urinalysis Basic - ( 2025 12:24 )    Color: x / Appearance: x / SG: x / pH: x  Gluc: 95 mg/dL / Ketone: x  / Bili: x / Urobili: x   Blood: x / Protein: x / Nitrite: x   Leuk Esterase: x / RBC: x / WBC x   Sq Epi: x / Non Sq Epi: x / Bacteria: x      CULTURES:      Physical Examination:    General: No acute distress.  Alert, oriented, interactive, nonfocal    HEENT: Pupils equal, reactive to light.  Symmetric.    PULM: Clear to auscultation bilaterally, no significant sputum production    CVS: Regular rate and rhythm, no murmurs, rubs, or gallops    ABD: Soft, nondistended, nontender, normoactive bowel sounds, no masses    EXT: No edema, nontender    SKIN: Warm and well perfused, no rashes noted.    RADIOLOGY: ***    CRITICAL CARE TIME SPENT: ***   Patient is a 64y old  Female who presents with a chief complaint of Pneumonia (2025 16:48)      BRIEF HOSPITAL COURSE:   64F with PMHx HLD, osteoarthritis, osteopenia, GERD, HLD, obesity who presented to ED with URI symptoms, N/V, and diarrhea x 5 days. Upon arrival to ED pt was noted to be hypoxic with sats 70s and SOB. RVP + flu A. CT showed bilateral GGO and bilateral lower lobe consolidations. Placed on NIPPV. Admitted to SDU.      Events last 24 hours: afebrile, hemodynamics stable, transitioned to HFNC and comfortable. Denies CP, SOB, abd pain, N/V.    PAST MEDICAL & SURGICAL HISTORY:  GERD (gastroesophageal reflux disease)      Chronic sinusitis      Hypercholesterolemia      Obesity      Osteoarthritis      Osteopenia      Torn meniscus  left      S/P left knee arthroscopy  ,       H/O: hysterectomy  b/l salpingectomy, oophorectomy left      H/O  section  ,           Review of Systems:  12 pt ROS negative unless otherwise stated above.    Medications:  azithromycin  IVPB 500 milliGRAM(s) IV Intermittent daily  cefTRIAXone Injectable. 1000 milliGRAM(s) IV Push every 24 hours  oseltamivir 75 milliGRAM(s) Oral two times a day      guaifenesin/dextromethorphan Oral Liquid 10 milliLiter(s) Oral every 4 hours PRN    acetaminophen     Tablet .. 650 milliGRAM(s) Oral every 6 hours PRN  melatonin 3 milliGRAM(s) Oral at bedtime PRN  ondansetron Injectable 4 milliGRAM(s) IV Push every 8 hours PRN    raloxifene 60 milliGRAM(s) Oral daily      aluminum hydroxide/magnesium hydroxide/simethicone Suspension 30 milliLiter(s) Oral every 4 hours PRN  pantoprazole    Tablet 40 milliGRAM(s) Oral before breakfast      dextrose 50% Injectable 25 Gram(s) IV Push once  dextrose 50% Injectable 12.5 Gram(s) IV Push once  dextrose 50% Injectable 25 Gram(s) IV Push once  dextrose Oral Gel 15 Gram(s) Oral once PRN  ezetimibe 10 milliGRAM(s) Oral daily  glucagon  Injectable 1 milliGRAM(s) IntraMuscular once  insulin lispro (ADMELOG) corrective regimen sliding scale   SubCutaneous three times a day before meals  rosuvastatin 20 milliGRAM(s) Oral at bedtime    dextrose 5%. 1000 milliLiter(s) IV Continuous <Continuous>  dextrose 5%. 1000 milliLiter(s) IV Continuous <Continuous>      benzocaine/menthol Lozenge 1 Lozenge Oral every 4 hours PRN  chlorhexidine 2% Cloths 1 Application(s) Topical <User Schedule>            ICU Vital Signs Last 24 Hrs  T(C): 37.1 (2025 17:12), Max: 37.3 (2025 16:07)  T(F): 98.8 (2025 17:12), Max: 99.1 (2025 16:07)  HR: 100 (2025 00:00) (98 - 117)  BP: 112/70 (2025 00:00) (93/59 - 138/75)  BP(mean): 80 (2025 00:00) (69 - 105)  ABP: --  ABP(mean): --  RR: 24 (2025 00:00) (19 - 25)  SpO2: 98% (2025 00:00) (82% - 99%)    O2 Parameters below as of 2025 00:00  Patient On (Oxygen Delivery Method): nasal cannula, high flow  O2 Flow (L/min): 45  O2 Concentration (%): 60      I&O's Summary    2025 07:01  -  2025 01:31  --------------------------------------------------------  IN: 0 mL / OUT: 400 mL / NET: -400 mL                LABS:                       12.7   4.57  )-----------( 129      ( 2025 12:24 )             38.3         131[L]  |  100  |  21  ----------------------------<  95  2.6[LL]   |  22  |  0.77    Ca    7.8[L]      2025 12:24  Phos  2.1     -  Mg     1.7         TPro  7.4  /  Alb  2.9[L]  /  TBili  0.6  /  DBili  x   /  AST  42[H]  /  ALT  27  /  AlkPhos  58            CAPILLARY BLOOD GLUCOSE      POCT Blood Glucose.: 102 mg/dL (2025 18:51)    PT/INR - ( 2025 12:24 )   PT: 12.0 sec;   INR: 1.02 ratio         PTT - ( 2025 12:24 )  PTT:36.0 sec  Urinalysis Basic - ( 2025 12:24 )    Color: x / Appearance: x / SG: x / pH: x  Gluc: 95 mg/dL / Ketone: x  / Bili: x / Urobili: x   Blood: x / Protein: x / Nitrite: x   Leuk Esterase: x / RBC: x / WBC x   Sq Epi: x / Non Sq Epi: x / Bacteria: x          Physical Examination:    General: No acute distress.  Alert, oriented x 3, interactive, nonfocal    HEENT: Pupils equal, reactive to light.  Symmetric.    PULM: Diminished BS bilateral bases, +scattered insp rales    CVS: Regular rate and rhythm    ABD: Soft, nondistended, nontender, normoactive bowel sounds    EXT: No edema    SKIN: Warm and well perfused    RADIOLOGY:   ACC: [id number] EXAM:  CT ABDOMEN AND PELVIS IC   ORDERED BY: PAUL BOWLES     ACC: [id number] EXAM:  CT ANGIO CHEST PULM ART WAWIC   ORDERED BY: PAUL BOWLES     PROCEDURE DATE:  2025          INTERPRETATION:  INDICATION:  64-year-old female with shortness of   breath, fever, abdominal pain, and nausea and emesis    TECHNIQUE: A volumetric CTA acquisition of the chest/abdomen/pelvis was   obtained from the thoracic inlet to the upper thighs, after    administration of intravenous contrast using a pulmonary embolus   protocol. 3-D maximum intensity projection images were created.    This CT scan was performed with one or more of the following dose   optimization techniques: iterative reconstruction, automatic exposure   control, and/or manual adjustment of mAs and kVp according to the   patient's size.    COMPARISON: No prior studies are available for comparison    CONTRAST: Omnipaque 350 (accession [id number]), IV contrast documented in   unlinked concurrent exam (accession [id number]). 90 cc administered. 0 cc   discarded.    FINDINGS:  LINES AND TUBES: None    PULMONARY ARTERIES:  There is suboptimal timing of the contrast bolus. There is opacification   through the level of the lobar and segmental pulmonary arteries. There is   moderate respiratory motion artifact. No filling defects are present to   the level of the lobar and segmental pulmonary arteries. Evaluation of   more distal subsegmental pulmonary arteries is limited by the suboptimal   contrast bolus.The main pulmonary artery is normal in size.  The heart   size is within normal limits.   The right heart is not enlarged.    MEDIASTINUM:  The thyroid and thoracic inlet are normal. There are multiple   subcentimeter mediastinal lymph nodes, likely reactive.  No pericardial   effusion is present. The esophagus is normal.    LUNGS/PLEURA:  Central tracheobronchial tree is patent. There are airway associated   groundglass opacity and bibasilar consolidations. There are small   bilateral pleural effusions.    LIVER: The liver parenchyma is mildly enlarged measuring up to 15.8 cm in   midline. The liver demonstrates normal attenuation.  No focal liver   lesion is noted. The portal vein is patent.    BILIARY TREE: There is no intrahepatic or extrahepatic biliary ductal   dilatation.    GALLBLADDER: The gallbladder is physiologically distended. There are no   radiopaque gallstones.    PANCREAS: The pancreas is normal in size and contour. There is no   pancreatic ductal dilatation.    SPLEEN: The spleen is normal in size.    ADRENAL GLANDS: The adrenal glands are unremarkable.    KIDNEYS/URETERS: The kidneys are symmetric in size. Both kidneys   demonstrate symmetric uptake of intravenous contrast.  There is no   hydronephrosis.    URINARY BLADDER: The urinary bladder is unremarkable.    REPRODUCTIVE: Uterus is surgically removed or atrophic. There is no   adnexal mass.    BOWEL: There is no evidence of bowel obstruction or abnormal bowel wall   thickening. The appendix is normal in caliber.    MESENTERY/PERITONEUM: There is no free air or  fluid in the abdomen or   pelvis. There is no drainable fluid collection.    LYMPH NODES: There are no enlarged abdominal or pelvic lymph nodes by   size criteria.    SOFT TISSUES: The soft tissues are unremarkable.    BONES: There is moderate dextroscoliosis with apex at the level of L2-L3   vertebral bodies. There are multilevel degenerative changes of thoracic   spine.    IMPRESSION:  No pulmonary embolism through the level of the lobar and segmental   pulmonary arteries. Evaluation of more distal subsegmental pulmonary   arteries is limited by suboptimal contrast bolus timing  Bilateral airway associated groundglass opacities in all 5 lobes. Small   bibasilar consolidations and pleural effusions, likely infectious.  No acute abdominal finding.    --- End of Report ---            DELIO PETERSON MD; Attending Radiologist  This document has been electronically signed. 2025  1:16PM    CRITICAL CARE TIME SPENT:  35 mins assessing presenting problems of acute illness that poses high probability of life threatening deterioration or end organ damage/dysfunction.  Medical decision making including Initiating plan of care, reviewing data, reviewing radiology ,direct patient bedside evaluation and interpretation of vital signs, any necessary noninvasive ventilator management , discussion with multidisciplinary team, all non inclusive of procedures. Date of entry of note is equal to date of services rendered.

## 2025-01-02 NOTE — PROGRESS NOTE ADULT - SUBJECTIVE AND OBJECTIVE BOX
Patient is a 64y old  Female who presents with a chief complaint of Pneumonia (2025 01:05)    BRIEF HOSPITAL COURSE: 63yo female PMHx HLD, osteoarthritis, osteopenia, presented to the emergency department on 1/1/15 for 5 days of URI symptoms, nausea, vomiting, and diarrhea. Today, her SpO2 on her home pulse ox was low, so she went to urgent care and was then sent to the ER.     1/2 awake and alert, weaned off HFNC now on NC breathing comfortably. c/o cough, and diarrhea - watery, and nasal congestion, sinus headache    PAST MEDICAL & SURGICAL HISTORY:  GERD (gastroesophageal reflux disease)  Chronic sinusitis  Hypercholesterolemia  Obesit  Osteoarthritis  Osteopenia  Torn meniscus  left  S/P left knee arthroscopy  ,   H/O: hysterectomy  b/l salpingectomy, oophorectomy left  H/O  section  ,       Medications:  azithromycin  IVPB 500 milliGRAM(s) IV Intermittent daily  cefTRIAXone Injectable. 1000 milliGRAM(s) IV Push every 24 hours  oseltamivir 75 milliGRAM(s) Oral two times a day  guaiFENesin  milliGRAM(s) Oral every 12 hours  acetaminophen     Tablet .. 650 milliGRAM(s) Oral every 6 hours PRN  melatonin 3 milliGRAM(s) Oral at bedtime PRN  ondansetron Injectable 4 milliGRAM(s) IV Push every 8 hours PRN  raloxifene 60 milliGRAM(s) Oral daily  enoxaparin Injectable 40 milliGRAM(s) SubCutaneous every 24 hours  aluminum hydroxide/magnesium hydroxide/simethicone Suspension 30 milliLiter(s) Oral every 4 hours PRN  pantoprazole    Tablet 40 milliGRAM(s) Oral before breakfast  dextrose 50% Injectable 25 Gram(s) IV Push once  dextrose 50% Injectable 12.5 Gram(s) IV Push once  dextrose 50% Injectable 25 Gram(s) IV Push once  dextrose Oral Gel 15 Gram(s) Oral once PRN  ezetimibe 10 milliGRAM(s) Oral daily  glucagon  Injectable 1 milliGRAM(s) IntraMuscular once  insulin lispro (ADMELOG) corrective regimen sliding scale   SubCutaneous three times a day before meals  rosuvastatin 20 milliGRAM(s) Oral at bedtime  dextrose 5%. 1000 milliLiter(s) IV Continuous <Continuous>  dextrose 5%. 1000 milliLiter(s) IV Continuous <Continuous>  benzocaine/menthol Lozenge 1 Lozenge Oral every 4 hours PRN  chlorhexidine 2% Cloths 1 Application(s) Topical <User Schedule>  chlorhexidine 4% Liquid 1 Application(s) Topical <User Schedule>  sodium chloride 0.65% Nasal 1 Spray(s) Both Nostrils four times a day PRN      ICU Vital Signs Last 24 Hrs  T(C): 37.4 (2025 09:11), Max: 37.4 (2025 09:11)  T(F): 99.4 (2025 09:11), Max: 99.4 (2025 09:11)  HR: 93 (2025 08:00) (87 - 112)  BP: 92/80 (2025 08:00) (92/80 - 138/75)  BP(mean): 86 (2025 08:00) (77 - 105)  RR: 27 (2025 08:47) (19 - 27)  SpO2: 97% (2025 08:47) (87% - 99%)    O2 Parameters below as of 2025 08:47  Patient On (Oxygen Delivery Method): nasal cannula  O2 Flow (L/min): 4      I&O's Detail    2025 07:01  -  2025 07:00  --------------------------------------------------------  IN:  Total IN: 0 mL    OUT:    Voided (mL): 400 mL  Total OUT: 400 mL    Total NET: -400 mL      2025 07:01  -  2025 11:54  --------------------------------------------------------  IN:  Total IN: 0 mL    OUT:    Voided (mL): 200 mL  Total OUT: 200 mL    Total NET: -200 mL      LABS:                        10.2   4.39  )-----------( 116      ( 2025 05:44 )             31.2         138  |  109[H]  |  10  ----------------------------<  88  3.5   |  22  |  0.38[L]    Ca    7.3[L]      2025 05:44  Phos  1.2       Mg     1.9         TPro  5.6[L]  /  Alb  2.1[L]  /  TBili  0.4  /  DBili  x   /  AST  43[H]  /  ALT  26  /  AlkPhos  55      CAPILLARY BLOOD GLUCOSE    POCT Blood Glucose.: 104 mg/dL (2025 11:29)    PT/INR - ( 2025 12:24 )   PT: 12.0 sec;   INR: 1.02 ratio    PTT - ( 2025 12:24 )  PTT:36.0 sec  Urinalysis Basic - ( 2025 05:44 )    Color: x / Appearance: x / SG: x / pH: x  Gluc: 88 mg/dL / Ketone: x  / Bili: x / Urobili: x   Blood: x / Protein: x / Nitrite: x   Leuk Esterase: x / RBC: x / WBC x   Sq Epi: x / Non Sq Epi: x / Bacteria: x    CULTURES:  Physical Examination:    General: No acute distress.    HEENT: dry lips  PULM: course breath sounds at bases b/l, no wheezing  CVS: Regular rate and rhythm, no murmurs  ABD: Soft, nondistended, nontender  EXT: No LE edema b/l   SKIN: Warm and well perfused,  NEURO: Alert, oriented, interactive    DEVICES:     RADIOLOGY:   < from: CT Angio Chest PE Protocol w/ IV Cont (25 @ 12:56) >  IMPRESSION:  No pulmonary embolism through the level of the lobar and segmental   pulmonary arteries. Evaluation of more distal subsegmental pulmonary   arteries is limited by suboptimal contrast bolus timing  Bilateral airway associated groundglass opacities in all 5 lobes. Small   bibasilar consolidations and pleural effusions, likely infectious.  No acute abdominal finding.    < end of copied text >

## 2025-01-02 NOTE — PROGRESS NOTE ADULT - ASSESSMENT
Impression:  1. acute hypoxemic respiratory failure  2. influenza A infection/viral PNA  3. hypokalemia  4. multifocal PNA Impression:  1. acute hypoxemic respiratory failure  2. influenza A infection/viral PNA  3. hypokalemia  4. multifocal PNA      Plan:  Neuro - stable, nonfocal    CV -  hemodynamic stable, NSR           lactate normal           no CP    Pulm -  HFNC with high level flow for PEEP effect to enhance alveolar recruitment             actively titrating FiO2 for sats>90%             CT showing both GGO and bilateral LL consolidations             empiric Bs IV abx with Ctx and atypical coverage             tamiflu             check legionella Ag/Strept Ag/mycoplasma PCR             pt high risk of pulmonary decompensation and potential need for mech vent support    GI: NPO     GI -  PPI  Enteric feeds as tolerated in tandem with NMB and prone ventilation    Renal - Even to negative fluid balance as tolerated by hemodynamics and renal fx.  Feeds to be provided in lieu of IVF.     Heme -  Pharmacologic DVT PPx  in addition to SCD's    ID - ABX discontinuation based on discussion with ID in conjunction with clinical features, culture data, and judicious procalcitonin monitoring.      Endo -  Aggressive glycemic control to limit FS glucose to < 180mg/dl.     Impression:  1. acute hypoxemic respiratory failure  2. influenza A infection/viral PNA  3. hypokalemia  4. multifocal PNA      Plan:  Neuro - stable, nonfocal    CV -  hemodynamic stable, NSR           lactate normal           no CP    Pulm -  HFNC with high level flow for PEEP effect to enhance alveolar recruitment             actively titrating FiO2 for sats>90% currently on 60% FiO2 attempt to wean [id number] by 5-10% to <40%             CTA showing both GGO and bilateral LL consolidations, no PE             empiric Bs IV abx with Ctx and atypical coverage             tamiflu             check legionella Ag/Strept Ag/mycoplasma PCR             NIPPV on standby if needed for WOB             pt high risk of pulmonary decompensation and potential need for mech vent support    GI: PO diet as tolerated if able to remain off NIPPV         PPI for GIU ppx       Renal - Cr stable             monitor I/Os             K repleted, repeat BMP in am    Heme -  add lovenox for  Pharmacologic DVT PPx  in addition to SCD's               no signs of active bleeding, H/H stable    ID - afebrile, WBC normal        BCx pending        RVP + flu        CT with GGO and bilateral LL consolidations likely superimposed PNA        check procal        tamiflu        empiric BS IV abx with Ctx and Azithro, no hx MDRO        trend WBC and temp curve    Endo -  BS stable, ISS coverage with AC FSG             goal to keep euglycemic 120-180mg/dl

## 2025-01-03 LAB
ANION GAP SERPL CALC-SCNC: 3 MMOL/L — LOW (ref 5–17)
BUN SERPL-MCNC: 6 MG/DL — LOW (ref 7–23)
CALCIUM SERPL-MCNC: 7.5 MG/DL — LOW (ref 8.5–10.1)
CHLORIDE SERPL-SCNC: 108 MMOL/L — SIGNIFICANT CHANGE UP (ref 96–108)
CO2 SERPL-SCNC: 27 MMOL/L — SIGNIFICANT CHANGE UP (ref 22–31)
CREAT SERPL-MCNC: 0.46 MG/DL — LOW (ref 0.5–1.3)
EGFR: 107 ML/MIN/1.73M2 — SIGNIFICANT CHANGE UP
GI PCR PANEL: SIGNIFICANT CHANGE UP
GLUCOSE BLDC GLUCOMTR-MCNC: 107 MG/DL — HIGH (ref 70–99)
GLUCOSE BLDC GLUCOMTR-MCNC: 85 MG/DL — SIGNIFICANT CHANGE UP (ref 70–99)
GLUCOSE BLDC GLUCOMTR-MCNC: 88 MG/DL — SIGNIFICANT CHANGE UP (ref 70–99)
GLUCOSE BLDC GLUCOMTR-MCNC: 90 MG/DL — SIGNIFICANT CHANGE UP (ref 70–99)
GLUCOSE SERPL-MCNC: 91 MG/DL — SIGNIFICANT CHANGE UP (ref 70–99)
HCT VFR BLD CALC: 34.1 % — LOW (ref 34.5–45)
HGB BLD-MCNC: 10.9 G/DL — LOW (ref 11.5–15.5)
MAGNESIUM SERPL-MCNC: 1.6 MG/DL — SIGNIFICANT CHANGE UP (ref 1.6–2.6)
MCHC RBC-ENTMCNC: 29.5 PG — SIGNIFICANT CHANGE UP (ref 27–34)
MCHC RBC-ENTMCNC: 32 G/DL — SIGNIFICANT CHANGE UP (ref 32–36)
MCV RBC AUTO: 92.2 FL — SIGNIFICANT CHANGE UP (ref 80–100)
PHOSPHATE SERPL-MCNC: 1.5 MG/DL — LOW (ref 2.5–4.5)
PLATELET # BLD AUTO: 159 K/UL — SIGNIFICANT CHANGE UP (ref 150–400)
POTASSIUM SERPL-MCNC: 3.2 MMOL/L — LOW (ref 3.5–5.3)
POTASSIUM SERPL-SCNC: 3.2 MMOL/L — LOW (ref 3.5–5.3)
RBC # BLD: 3.7 M/UL — LOW (ref 3.8–5.2)
RBC # FLD: 12.4 % — SIGNIFICANT CHANGE UP (ref 10.3–14.5)
SODIUM SERPL-SCNC: 138 MMOL/L — SIGNIFICANT CHANGE UP (ref 135–145)
WBC # BLD: 4.86 K/UL — SIGNIFICANT CHANGE UP (ref 3.8–10.5)
WBC # FLD AUTO: 4.86 K/UL — SIGNIFICANT CHANGE UP (ref 3.8–10.5)

## 2025-01-03 PROCEDURE — 99232 SBSQ HOSP IP/OBS MODERATE 35: CPT

## 2025-01-03 PROCEDURE — 99233 SBSQ HOSP IP/OBS HIGH 50: CPT

## 2025-01-03 RX ORDER — POTASSIUM CHLORIDE 600 MG/1
40 TABLET, FILM COATED, EXTENDED RELEASE ORAL ONCE
Refills: 0 | Status: COMPLETED | OUTPATIENT
Start: 2025-01-03 | End: 2025-01-03

## 2025-01-03 RX ORDER — LOPERAMIDE HCL 1 MG/5 ML
2 LIQUID (ML) ORAL EVERY 6 HOURS
Refills: 0 | Status: DISCONTINUED | OUTPATIENT
Start: 2025-01-03 | End: 2025-01-05

## 2025-01-03 RX ORDER — SOD PHOS DI, MONO/K PHOS MONO 250 MG
2 TABLET ORAL ONCE
Refills: 0 | Status: COMPLETED | OUTPATIENT
Start: 2025-01-03 | End: 2025-01-03

## 2025-01-03 RX ADMIN — OSELTAMIVIR 75 MILLIGRAM(S): 75 CAPSULE ORAL at 22:49

## 2025-01-03 RX ADMIN — Medication 2 MILLIGRAM(S): at 23:49

## 2025-01-03 RX ADMIN — CEFTRIAXONE SODIUM 1000 MILLIGRAM(S): 1 INJECTION, POWDER, FOR SOLUTION INTRAMUSCULAR; INTRAVENOUS at 17:10

## 2025-01-03 RX ADMIN — RALOXIFENE HYDROCHLORIDE 60 MILLIGRAM(S): 60 TABLET ORAL at 10:35

## 2025-01-03 RX ADMIN — PANTOPRAZOLE 40 MILLIGRAM(S): 40 TABLET, DELAYED RELEASE ORAL at 10:35

## 2025-01-03 RX ADMIN — ENOXAPARIN SODIUM 40 MILLIGRAM(S): 60 INJECTION INTRAVENOUS; SUBCUTANEOUS at 10:34

## 2025-01-03 RX ADMIN — OSELTAMIVIR 75 MILLIGRAM(S): 75 CAPSULE ORAL at 10:36

## 2025-01-03 RX ADMIN — Medication 2 MILLIGRAM(S): at 17:17

## 2025-01-03 RX ADMIN — Medication 600 MILLIGRAM(S): at 22:48

## 2025-01-03 RX ADMIN — EZETIMIBE 10 MILLIGRAM(S): 10 TABLET ORAL at 10:35

## 2025-01-03 RX ADMIN — ACETAMINOPHEN 650 MILLIGRAM(S): 80 SOLUTION/ DROPS ORAL at 17:17

## 2025-01-03 RX ADMIN — Medication 3 MILLIGRAM(S): at 22:50

## 2025-01-03 RX ADMIN — ROSUVASTATIN 20 MILLIGRAM(S): 40 TABLET, FILM COATED ORAL at 22:49

## 2025-01-03 RX ADMIN — Medication 2 TABLET(S): at 14:09

## 2025-01-03 RX ADMIN — POTASSIUM CHLORIDE 40 MILLIEQUIVALENT(S): 600 TABLET, FILM COATED, EXTENDED RELEASE ORAL at 14:09

## 2025-01-03 RX ADMIN — ACETAMINOPHEN 650 MILLIGRAM(S): 80 SOLUTION/ DROPS ORAL at 18:15

## 2025-01-03 RX ADMIN — Medication 600 MILLIGRAM(S): at 10:36

## 2025-01-03 RX ADMIN — CHLORHEXIDINE GLUCONATE 1 APPLICATION(S): 1.2 RINSE ORAL at 08:00

## 2025-01-03 NOTE — PROVIDER CONTACT NOTE (OTHER) - SITUATION
Office is aware that patient is in [facility].  Please fax discharge papers to Office is aware that patient is in [facility].  Please fax discharge papers to [phone].

## 2025-01-03 NOTE — PROGRESS NOTE ADULT - TIME BILLING
greater than 50% of time spent reviewing labs, notes, orders and radiographs, coordinating care  discussed with nursing, icu team, consultants
.

## 2025-01-03 NOTE — PROGRESS NOTE ADULT - NS ATTEND AMEND GEN_ALL_CORE FT
Recommended artificial tears and warm compresses.
as above
65 y/o F with hx of HLD, OA, present on 1/1 for worsening URI symptoms, admitted to stepdown for acute hypoxic respiratory failure 2/2 to influenza A and likely superimposed strept pneumonia.  Was initially on high flow, able to wean to nasal canula on 1/2.   Patient has developed diarrhea, will send GI studies + C Diff   continue antibiotics with ceft and azithro  continue tamiflu  PT  will remain in stepdown today, if remains stable tomorrow likely downgrade

## 2025-01-03 NOTE — PROGRESS NOTE ADULT - SUBJECTIVE AND OBJECTIVE BOX
Patient is a 64y old  Female who presents with a chief complaint of Pneumonia (2025 01:05)    BRIEF HOSPITAL COURSE: 65yo female PMHx HLD, osteoarthritis, osteopenia, presented to the emergency department on 1/1/15 for 5 days of URI symptoms, nausea, vomiting, and diarrhea. Today, her SpO2 on her home pulse ox was low, so she went to urgent care and was then sent to the ER.     1/2 awake and alert, weaned off HFNC now on NC breathing comfortably. c/o cough, and diarrhea - watery, and nasal congestion, sinus headache  1/3 pt states that sinus congestion improving slightly. still with cough. having diarrhea still, but slightly less frequent than yesterday.    PAST MEDICAL & SURGICAL HISTORY:  GERD (gastroesophageal reflux disease)  Chronic sinusitis  Hypercholesterolemia  Obesit  Osteoarthritis  Osteopenia  Torn meniscus  left  S/P left knee arthroscopy  ,   H/O: hysterectomy  b/l salpingectomy, oophorectomy left  H/O  section  ,     MEDICATIONS  (STANDING):  cefTRIAXone Injectable. 1000 milliGRAM(s) IV Push every 24 hours  chlorhexidine 4% Liquid 1 Application(s) Topical <User Schedule>  dextrose 5%. 1000 milliLiter(s) (50 mL/Hr) IV Continuous <Continuous>  dextrose 5%. 1000 milliLiter(s) (100 mL/Hr) IV Continuous <Continuous>  dextrose 50% Injectable 25 Gram(s) IV Push once  dextrose 50% Injectable 12.5 Gram(s) IV Push once  dextrose 50% Injectable 25 Gram(s) IV Push once  enoxaparin Injectable 40 milliGRAM(s) SubCutaneous every 24 hours  ezetimibe 10 milliGRAM(s) Oral daily  glucagon  Injectable 1 milliGRAM(s) IntraMuscular once  guaiFENesin  milliGRAM(s) Oral every 12 hours  insulin lispro (ADMELOG) corrective regimen sliding scale   SubCutaneous three times a day before meals  oseltamivir 75 milliGRAM(s) Oral two times a day  pantoprazole    Tablet 40 milliGRAM(s) Oral before breakfast  raloxifene 60 milliGRAM(s) Oral daily  rosuvastatin 20 milliGRAM(s) Oral at bedtime    Vital Signs Last 24 Hrs  T(C): 37.3 (2025 09:14), Max: 37.7 (2025 17:15)  T(F): 99.1 (2025 09:14), Max: 99.9 (2025 17:15)  HR: 88 (2025 04:00) (88 - 102)  BP: 117/82 (2025 04:00) (105/85 - 141/78)  BP(mean): 93 (2025 04:00) (91 - 95)  RR: 21 (2025 04:00) (19 - 29)  SpO2: 94% (2025 04:00) (94% - 98%)      LABS:                       10.9   4.86  )-----------( 159      ( 2025 08:29 )             34.1     01-03    138  |  108  |  6[L]  ----------------------------<  91  3.2[L]   |  27  |  0.46[L]    Ca    7.5[L]      2025 08:29  Phos  1.5     -  Mg     1.6         TPro  5.6[L]  /  Alb  2.1[L]  /  TBili  0.4  /  DBili  x   /  AST  43[H]  /  ALT  26  /  AlkPhos  55  01-02    LIVER FUNCTIONS - ( 2025 05:44 )  Alb: 2.1 g/dL / Pro: 5.6 gm/dL / ALK PHOS: 55 U/L / ALT: 26 U/L / AST: 43 U/L / GGT: x           Urinalysis Basic - ( 2025 08:29 )    Color: x / Appearance: x / SG: x / pH: x  Gluc: 91 mg/dL / Ketone: x  / Bili: x / Urobili: x   Blood: x / Protein: x / Nitrite: x   Leuk Esterase: x / RBC: x / WBC x   Sq Epi: x / Non Sq Epi: x / Bacteria: x      CULTURES:  Physical Examination:    General: No acute distress.    HEENT: dry lips  PULM: course breath sounds at bases b/l, no wheezing  CVS: Regular rate and rhythm, no murmurs  ABD: Soft, nondistended, nontender  EXT: No LE edema b/l   SKIN: Warm and well perfused,  NEURO: Alert, oriented, interactive    DEVICES:     RADIOLOGY:   < from: CT Angio Chest PE Protocol w/ IV Cont (25 @ 12:56) >  IMPRESSION:  No pulmonary embolism through the level of the lobar and segmental   pulmonary arteries. Evaluation of more distal subsegmental pulmonary   arteries is limited by suboptimal contrast bolus timing  Bilateral airway associated groundglass opacities in all 5 lobes. Small   bibasilar consolidations and pleural effusions, likely infectious.  No acute abdominal finding.    < end of copied text >

## 2025-01-03 NOTE — PROGRESS NOTE ADULT - SUBJECTIVE AND OBJECTIVE BOX
HOSPITALIST PROGRESS NOTE -- DOWNGRADE SICU TO MED/SURG    Admission HPI: 64-year-old female PMH HLD, osteoarthritis, osteopenia, presented to the emergency department on 1/1/15 for 5 days of URI symptoms, nausea, vomiting, and diarrhea. Today, her SpO2 on her home pulse ox was low, so she went to urgent care and was then sent to the ER. On arrival, patient found to have SpO2 78% on RA. Patient found to have multifocal PNA, and to be influenza A positive. Patient ultimately started on BiPAP for hypoxia and work of breathing.     Patient seen and examined at bedside. Now improving.     ROS: All 10 systems reviewed and found to be negative with the exception of what has been described above.     VITAL SIGNS:  Vital Signs Last 24 Hrs  T(C): 37.3 (03 Jan 2025 09:14), Max: 37.3 (03 Jan 2025 09:14)  T(F): 99.1 (03 Jan 2025 09:14), Max: 99.1 (03 Jan 2025 09:14)  HR: 97 (03 Jan 2025 16:16) (88 - 100)  BP: 138/85 (03 Jan 2025 16:16) (105/85 - 141/78)  BP(mean): 100 (03 Jan 2025 16:16) (91 - 100)  RR: 32 (03 Jan 2025 16:16) (20 - 32)  SpO2: 94% (03 Jan 2025 16:16) (94% - 98%)    Parameters below as of 03 Jan 2025 16:16  Patient On (Oxygen Delivery Method): room air      PHYSICAL EXAM:  General: no acute distress  HEENT: NC/AT; PERRL, anicteric sclera; MMM  Neck: Supple  Cardiovascular: +S1/S2, RRR, no murmurs, rubs, gallops  Respiratory: CTA B/L; no W/R/R  Gastrointestinal: soft, NT/ND; +BSx4  Extremities: WWP; no edema, clubbing or cyanosis  Vascular: 2+ radial, DP/PT pulses B/L  Neurological: AAOx3; no focal deficits    MEDICATIONS:  MEDICATIONS  (STANDING):  cefTRIAXone Injectable. 1000 milliGRAM(s) IV Push every 24 hours  chlorhexidine 4% Liquid 1 Application(s) Topical <User Schedule>  dextrose 5%. 1000 milliLiter(s) (100 mL/Hr) IV Continuous <Continuous>  dextrose 5%. 1000 milliLiter(s) (50 mL/Hr) IV Continuous <Continuous>  dextrose 50% Injectable 25 Gram(s) IV Push once  dextrose 50% Injectable 12.5 Gram(s) IV Push once  dextrose 50% Injectable 25 Gram(s) IV Push once  enoxaparin Injectable 40 milliGRAM(s) SubCutaneous every 24 hours  ezetimibe 10 milliGRAM(s) Oral daily  glucagon  Injectable 1 milliGRAM(s) IntraMuscular once  guaiFENesin  milliGRAM(s) Oral every 12 hours  insulin lispro (ADMELOG) corrective regimen sliding scale   SubCutaneous three times a day before meals  oseltamivir 75 milliGRAM(s) Oral two times a day  pantoprazole    Tablet 40 milliGRAM(s) Oral before breakfast  raloxifene 60 milliGRAM(s) Oral daily  rosuvastatin 20 milliGRAM(s) Oral at bedtime    MEDICATIONS  (PRN):  acetaminophen     Tablet .. 650 milliGRAM(s) Oral every 6 hours PRN Temp greater or equal to 38C (100.4F), Mild Pain (1 - 3)  aluminum hydroxide/magnesium hydroxide/simethicone Suspension 30 milliLiter(s) Oral every 4 hours PRN Dyspepsia  benzocaine/menthol Lozenge 1 Lozenge Oral every 4 hours PRN Sore Throat  dextrose Oral Gel 15 Gram(s) Oral once PRN Blood Glucose LESS THAN 70 milliGRAM(s)/deciliter  loperamide 2 milliGRAM(s) Oral every 6 hours PRN Diarrhea  melatonin 3 milliGRAM(s) Oral at bedtime PRN Insomnia  ondansetron Injectable 4 milliGRAM(s) IV Push every 8 hours PRN Nausea and/or Vomiting  sodium chloride 0.65% Nasal 1 Spray(s) Both Nostrils four times a day PRN Nasal Congestion      ALLERGIES:  Allergies    No Known Allergies    Intolerances        LABS:                        10.9   4.86  )-----------( 159      ( 03 Jan 2025 08:29 )             34.1     01-03    138  |  108  |  6[L]  ----------------------------<  91  3.2[L]   |  27  |  0.46[L]    Ca    7.5[L]      03 Jan 2025 08:29  Phos  1.5     01-03  Mg     1.6     01-03    TPro  5.6[L]  /  Alb  2.1[L]  /  TBili  0.4  /  DBili  x   /  AST  43[H]  /  ALT  26  /  AlkPhos  55  01-02      Urinalysis Basic - ( 03 Jan 2025 08:29 )    Color: x / Appearance: x / SG: x / pH: x  Gluc: 91 mg/dL / Ketone: x  / Bili: x / Urobili: x   Blood: x / Protein: x / Nitrite: x   Leuk Esterase: x / RBC: x / WBC x   Sq Epi: x / Non Sq Epi: x / Bacteria: x      CAPILLARY BLOOD GLUCOSE      POCT Blood Glucose.: 88 mg/dL (03 Jan 2025 17:12)        RADIOLOGY & ADDITIONAL TESTS: Reviewed. HOSPITALIST PROGRESS NOTE -- DOWNGRADE SICU TO MED/SURG    Admission HPI: 64-year-old female PMH HLD, osteoarthritis, osteopenia, presented to the emergency department on 1/1/15 for 5 days of URI symptoms, nausea, vomiting, and diarrhea. Today, her SpO2 on her home pulse ox was low, so she went to urgent care and was then sent to the ER. On arrival, patient found to have SpO2 78% on RA. Patient found to have multifocal PNA, and to be influenza A positive. Patient ultimately started on BiPAP for hypoxia and work of breathing.     Patient seen and examined at bedside. Breathing improving. Had diarrhea overnight. Still feels weak with ambulation. Awaiting PT consult    ROS: All 10 systems reviewed and found to be negative with the exception of what has been described above.     VITAL SIGNS:  Vital Signs Last 24 Hrs  T(C): 37.3 (03 Jan 2025 09:14), Max: 37.3 (03 Jan 2025 09:14)  T(F): 99.1 (03 Jan 2025 09:14), Max: 99.1 (03 Jan 2025 09:14)  HR: 97 (03 Jan 2025 16:16) (88 - 100)  BP: 138/85 (03 Jan 2025 16:16) (105/85 - 141/78)  BP(mean): 100 (03 Jan 2025 16:16) (91 - 100)  RR: 32 (03 Jan 2025 16:16) (20 - 32)  SpO2: 94% (03 Jan 2025 16:16) (94% - 98%)    Parameters below as of 03 Jan 2025 16:16  Patient On (Oxygen Delivery Method): room air      PHYSICAL EXAM:  General: no acute distress  HEENT: NC/AT; PERRL, anicteric sclera; MMM  Neck: Supple  Cardiovascular: +S1/S2, RRR, no murmurs, rubs, gallops  Respiratory: CTA B/L; no W/R/R  Gastrointestinal: soft, NT/ND; +BSx4  Extremities: WWP; no edema, clubbing or cyanosis  Vascular: 2+ radial, DP/PT pulses B/L  Neurological: AAOx3; no focal deficits    MEDICATIONS:  MEDICATIONS  (STANDING):  cefTRIAXone Injectable. 1000 milliGRAM(s) IV Push every 24 hours  chlorhexidine 4% Liquid 1 Application(s) Topical <User Schedule>  dextrose 5%. 1000 milliLiter(s) (100 mL/Hr) IV Continuous <Continuous>  dextrose 5%. 1000 milliLiter(s) (50 mL/Hr) IV Continuous <Continuous>  dextrose 50% Injectable 25 Gram(s) IV Push once  dextrose 50% Injectable 12.5 Gram(s) IV Push once  dextrose 50% Injectable 25 Gram(s) IV Push once  enoxaparin Injectable 40 milliGRAM(s) SubCutaneous every 24 hours  ezetimibe 10 milliGRAM(s) Oral daily  glucagon  Injectable 1 milliGRAM(s) IntraMuscular once  guaiFENesin  milliGRAM(s) Oral every 12 hours  insulin lispro (ADMELOG) corrective regimen sliding scale   SubCutaneous three times a day before meals  oseltamivir 75 milliGRAM(s) Oral two times a day  pantoprazole    Tablet 40 milliGRAM(s) Oral before breakfast  raloxifene 60 milliGRAM(s) Oral daily  rosuvastatin 20 milliGRAM(s) Oral at bedtime    MEDICATIONS  (PRN):  acetaminophen     Tablet .. 650 milliGRAM(s) Oral every 6 hours PRN Temp greater or equal to 38C (100.4F), Mild Pain (1 - 3)  aluminum hydroxide/magnesium hydroxide/simethicone Suspension 30 milliLiter(s) Oral every 4 hours PRN Dyspepsia  benzocaine/menthol Lozenge 1 Lozenge Oral every 4 hours PRN Sore Throat  dextrose Oral Gel 15 Gram(s) Oral once PRN Blood Glucose LESS THAN 70 milliGRAM(s)/deciliter  loperamide 2 milliGRAM(s) Oral every 6 hours PRN Diarrhea  melatonin 3 milliGRAM(s) Oral at bedtime PRN Insomnia  ondansetron Injectable 4 milliGRAM(s) IV Push every 8 hours PRN Nausea and/or Vomiting  sodium chloride 0.65% Nasal 1 Spray(s) Both Nostrils four times a day PRN Nasal Congestion      ALLERGIES:  Allergies    No Known Allergies    Intolerances        LABS:                        10.9   4.86  )-----------( 159      ( 03 Jan 2025 08:29 )             34.1     01-03    138  |  108  |  6[L]  ----------------------------<  91  3.2[L]   |  27  |  0.46[L]    Ca    7.5[L]      03 Jan 2025 08:29  Phos  1.5     01-03  Mg     1.6     01-03    TPro  5.6[L]  /  Alb  2.1[L]  /  TBili  0.4  /  DBili  x   /  AST  43[H]  /  ALT  26  /  AlkPhos  55  01-02      Urinalysis Basic - ( 03 Jan 2025 08:29 )    Color: x / Appearance: x / SG: x / pH: x  Gluc: 91 mg/dL / Ketone: x  / Bili: x / Urobili: x   Blood: x / Protein: x / Nitrite: x   Leuk Esterase: x / RBC: x / WBC x   Sq Epi: x / Non Sq Epi: x / Bacteria: x      CAPILLARY BLOOD GLUCOSE      POCT Blood Glucose.: 88 mg/dL (03 Jan 2025 17:12)        RADIOLOGY & ADDITIONAL TESTS: Reviewed.

## 2025-01-03 NOTE — PROGRESS NOTE ADULT - ASSESSMENT
ASSESSMENT   63yo female PMHx HLD, osteoarthritis, osteopenia, presented to the emergency department on 1/1/15 for 5 days of URI symptoms, nausea, vomiting, and diarrhea. Today, her SpO2 on her home pulse ox was low, so she went to urgent care and was then sent to the ER.     Admitted for   1. Acute hypoxic respiratory failure  2. + Influenza A with superimposed bacterial PNA  3. Hypokalemia HypoPhos  4. Diarrhea    PLAN    Neuro: AAOx 3. pain control prn tylenol  CV: BP stable. in sinus rhythm on monitor. cont statin  Pulm: off HFNC. now on 2-3L nc stating 94-95%. titrate to maintain O2 sat > 94%.  GI: PO diet as tolerated. C diff and GI PCR neg. will try imodium for diarrhea  Nephro: monitor I & Os. Trend renal fxn. Repleted K and Phos again  Endo: hold metformin. BGMs ac hs. ISS.   ID: cont IV CTX 1gm qd #3, dc azithromycin. cont tamiflu bid # 3. + urine strep. urine legionella neg.  f/u blood, urine cx and sputum cx.   Heme: Hgb drifting down 12.7 - > 10.2, check CBC 16:00. no evidence of bleeding, denies melena, BRBPR. DVT ppx - lovenox sq. SCD  PT eval    Dispo: Stable for med/surg with pulse ox.  IV abx. tamiflu. replete Phos and K    Will discuss with Dr. James    signed out to Dr. Wilson

## 2025-01-03 NOTE — PROGRESS NOTE ADULT - ASSESSMENT
64-year-old female PMH HLD, osteoarthritis, osteopenia, currently admitted to SICU with acute hypoxic respiratory failure 2/2 multifocal PNA, possible superimposed bacterial PNA on influenza A PNA. Stable for downgrade to med/surg,    # Multifocal PNA   # Influenza PNA   # Acute hypoxic respiratory failure   # Hypokalemia   # Prediabetes    -Initially admitted to SICU requiring BiPAP for increased WOB and hypoxia  -Now weaned to nasal cannula  -Continue Ceftriaxone for pneumonia  -Hypokalemia may be 2/2 diarrhea. Replete and monitor  -Started on Tamiflu for Influenza A, continue  -Avoiding steroids in the setting of influenza PNA  -Hold metformin, continue ISS    Lovenox for DVT ppx     Downgrade to med/surg

## 2025-01-04 LAB
ANION GAP SERPL CALC-SCNC: 7 MMOL/L — SIGNIFICANT CHANGE UP (ref 5–17)
BUN SERPL-MCNC: 7 MG/DL — SIGNIFICANT CHANGE UP (ref 7–23)
CALCIUM SERPL-MCNC: 7.7 MG/DL — LOW (ref 8.5–10.1)
CHLORIDE SERPL-SCNC: 109 MMOL/L — HIGH (ref 96–108)
CO2 SERPL-SCNC: 24 MMOL/L — SIGNIFICANT CHANGE UP (ref 22–31)
CREAT SERPL-MCNC: 0.46 MG/DL — LOW (ref 0.5–1.3)
EGFR: 107 ML/MIN/1.73M2 — SIGNIFICANT CHANGE UP
GLUCOSE BLDC GLUCOMTR-MCNC: 103 MG/DL — HIGH (ref 70–99)
GLUCOSE BLDC GLUCOMTR-MCNC: 89 MG/DL — SIGNIFICANT CHANGE UP (ref 70–99)
GLUCOSE BLDC GLUCOMTR-MCNC: 99 MG/DL — SIGNIFICANT CHANGE UP (ref 70–99)
GLUCOSE SERPL-MCNC: 92 MG/DL — SIGNIFICANT CHANGE UP (ref 70–99)
HCT VFR BLD CALC: 33.3 % — LOW (ref 34.5–45)
HGB BLD-MCNC: 11 G/DL — LOW (ref 11.5–15.5)
MAGNESIUM SERPL-MCNC: 1.5 MG/DL — LOW (ref 1.6–2.6)
MCHC RBC-ENTMCNC: 30 PG — SIGNIFICANT CHANGE UP (ref 27–34)
MCHC RBC-ENTMCNC: 33 G/DL — SIGNIFICANT CHANGE UP (ref 32–36)
MCV RBC AUTO: 90.7 FL — SIGNIFICANT CHANGE UP (ref 80–100)
PHOSPHATE SERPL-MCNC: 1.9 MG/DL — LOW (ref 2.5–4.5)
PLATELET # BLD AUTO: 238 K/UL — SIGNIFICANT CHANGE UP (ref 150–400)
POTASSIUM SERPL-MCNC: 3.7 MMOL/L — SIGNIFICANT CHANGE UP (ref 3.5–5.3)
POTASSIUM SERPL-SCNC: 3.7 MMOL/L — SIGNIFICANT CHANGE UP (ref 3.5–5.3)
RBC # BLD: 3.67 M/UL — LOW (ref 3.8–5.2)
RBC # FLD: 12.3 % — SIGNIFICANT CHANGE UP (ref 10.3–14.5)
SODIUM SERPL-SCNC: 140 MMOL/L — SIGNIFICANT CHANGE UP (ref 135–145)
WBC # BLD: 5.51 K/UL — SIGNIFICANT CHANGE UP (ref 3.8–10.5)
WBC # FLD AUTO: 5.51 K/UL — SIGNIFICANT CHANGE UP (ref 3.8–10.5)

## 2025-01-04 PROCEDURE — 99233 SBSQ HOSP IP/OBS HIGH 50: CPT

## 2025-01-04 RX ORDER — POTASSIUM PHOSPHATE, MONOBASIC AND POTASSIUM PHOSPHATE, DIBASIC 224; 236 MG/ML; MG/ML
15 INJECTION, SOLUTION INTRAVENOUS ONCE
Refills: 0 | Status: COMPLETED | OUTPATIENT
Start: 2025-01-04 | End: 2025-01-04

## 2025-01-04 RX ORDER — MAGNESIUM SULFATE 500 MG/ML
2 INJECTION, SOLUTION INTRAMUSCULAR; INTRAVENOUS ONCE
Refills: 0 | Status: COMPLETED | OUTPATIENT
Start: 2025-01-04 | End: 2025-01-04

## 2025-01-04 RX ADMIN — CEFTRIAXONE SODIUM 1000 MILLIGRAM(S): 1 INJECTION, POWDER, FOR SOLUTION INTRAMUSCULAR; INTRAVENOUS at 16:30

## 2025-01-04 RX ADMIN — OSELTAMIVIR 75 MILLIGRAM(S): 75 CAPSULE ORAL at 10:32

## 2025-01-04 RX ADMIN — ROSUVASTATIN 20 MILLIGRAM(S): 40 TABLET, FILM COATED ORAL at 22:12

## 2025-01-04 RX ADMIN — Medication 600 MILLIGRAM(S): at 22:12

## 2025-01-04 RX ADMIN — Medication 600 MILLIGRAM(S): at 10:31

## 2025-01-04 RX ADMIN — PANTOPRAZOLE 40 MILLIGRAM(S): 40 TABLET, DELAYED RELEASE ORAL at 06:25

## 2025-01-04 RX ADMIN — OSELTAMIVIR 75 MILLIGRAM(S): 75 CAPSULE ORAL at 22:12

## 2025-01-04 RX ADMIN — ENOXAPARIN SODIUM 40 MILLIGRAM(S): 60 INJECTION INTRAVENOUS; SUBCUTANEOUS at 10:31

## 2025-01-04 RX ADMIN — RALOXIFENE HYDROCHLORIDE 60 MILLIGRAM(S): 60 TABLET ORAL at 10:31

## 2025-01-04 RX ADMIN — EZETIMIBE 10 MILLIGRAM(S): 10 TABLET ORAL at 10:31

## 2025-01-04 RX ADMIN — POTASSIUM PHOSPHATE, MONOBASIC AND POTASSIUM PHOSPHATE, DIBASIC 62.5 MILLIMOLE(S): 224; 236 INJECTION, SOLUTION INTRAVENOUS at 10:33

## 2025-01-04 RX ADMIN — CHLORHEXIDINE GLUCONATE 1 APPLICATION(S): 1.2 RINSE ORAL at 06:24

## 2025-01-04 RX ADMIN — MAGNESIUM SULFATE 25 GRAM(S): 500 INJECTION, SOLUTION INTRAMUSCULAR; INTRAVENOUS at 08:23

## 2025-01-04 NOTE — PHYSICAL THERAPY INITIAL EVALUATION ADULT - PREDICTED DURATION OF THERAPY (DAYS/WKS), PT EVAL
Pt appears to be an ind ambulator as demonstrated this session, Pt also independent in the bathroom this session, NO LOB noted, pt is safe to ambulate w/ nursing staff, and for home discharge once medical ready.

## 2025-01-04 NOTE — PROGRESS NOTE ADULT - ASSESSMENT
64-year-old female PMH HLD, osteoarthritis, osteopenia, currently admitted to SICU with acute hypoxic respiratory failure 2/2 multifocal PNA, possible superimposed bacterial PNA on influenza A PNA. Stable for downgrade to med/surg,    # Multifocal PNA   # Influenza PNA   # Acute hypoxic respiratory failure   # Hypokalemia   # Prediabetes    -Initially admitted to SICU requiring BiPAP for increased WOB and hypoxia  -Now weaned to nasal cannula  -Continue Ceftriaxone for pneumonia  -Hypokalemia may be 2/2 diarrhea. Replete and monitor  -Started on Tamiflu for Influenza A, continue  -Avoiding steroids in the setting of influenza PNA  -Hold metformin, continue ISS    Lovenox for DVT ppx     Downgrade to med/surg   64-year-old female PMH HLD, osteoarthritis, osteopenia, currently admitted to SICU with acute hypoxic respiratory failure 2/2 multifocal PNA, possible superimposed bacterial PNA on influenza A PNA. Stable for downgrade to med/surg,    # Multifocal PNA   # Influenza PNA   # Acute hypoxic respiratory failure   # Hypokalemia  #Hypomagnesemia  #Hypophosphatemia   # Prediabetes    -Initially admitted to SICU requiring BiPAP for increased WOB and hypoxia  -Now weaned to nasal cannula  -Continue Ceftriaxone for pneumonia  -Electrolyte abnormalities may be 2/2 diarrhea. Replete prn  -Started on Tamiflu for Influenza A, continue  -Avoiding steroids in the setting of influenza PNA  -Hold metformin, continue ISS  -O2 eval  -PT eval    Lovenox for DVT ppx     Downgrade to med/surg    DISPO: Home O2 eval and PT eval today

## 2025-01-04 NOTE — PHYSICAL THERAPY INITIAL EVALUATION ADULT - GENERAL OBSERVATIONS, REHAB EVAL
Pt seen on SICU, on IP for FLU, maintained for session, Pt reported generalized weakness from not eating. VSS, O2 monitored for session 95% at rest and 90% during ambulation, O2 decreased to 85% at periods during session but pt recovered quickly.

## 2025-01-04 NOTE — PHYSICAL THERAPY INITIAL EVALUATION ADULT - MODALITIES TREATMENT COMMENTS
Pt returned to supine for rest, NAD, O2 92% at end of session, no SOB noted this session, RN aware, O2 monitor + after session, Pt on med surg. +alarm, [facility]

## 2025-01-04 NOTE — PHYSICAL THERAPY INITIAL EVALUATION ADULT - PERTINENT HX OF CURRENT PROBLEM, REHAB EVAL
64-year-old femalecurrently admitted to SICU with acute hypoxic respiratory failure 2/2 multifocal PNA, possible superimposed bacterial PNA on influenza A PNA. Stable for downgrade to med/surg,   PMH HLD, osteoarthritis, osteopenia,

## 2025-01-04 NOTE — PROGRESS NOTE ADULT - SUBJECTIVE AND OBJECTIVE BOX
incomplete note  incomplete note    HOSPITALIST PROGRESS NOTE    SUBJECTIVE / INTERVAL HPI: Patient seen and examined at bedside.     ROS: All 10 systems reviewed and found to be negative with the exception of what has been described above.     VITAL SIGNS:  Vital Signs Last 24 Hrs  T(C): 37.3 (04 Jan 2025 06:19), Max: 38.2 (03 Jan 2025 17:00)  T(F): 99.2 (04 Jan 2025 06:19), Max: 100.7 (03 Jan 2025 17:00)  HR: 99 (03 Jan 2025 18:00) (94 - 100)  BP: 125/83 (04 Jan 2025 04:00) (120/89 - 142/95)  BP(mean): 97 (04 Jan 2025 04:00) (93 - 110)  RR: 18 (04 Jan 2025 02:00) (18 - 32)  SpO2: 92% (04 Jan 2025 04:00) (91% - 95%)    Parameters below as of 03 Jan 2025 22:00  Patient On (Oxygen Delivery Method): room air      PHYSICAL EXAM:  General: no acute distress  HEENT: NC/AT; PERRL, anicteric sclera; MMM  Neck: Supple  Cardiovascular: +S1/S2, RRR, no murmurs, rubs, gallops  Respiratory: CTA B/L; no W/R/R  Gastrointestinal: soft, NT/ND; +BSx4  Extremities: WWP; no edema, clubbing or cyanosis  Vascular: 2+ radial, DP/PT pulses B/L  Neurological: AAOx3; no focal deficits    MEDICATIONS:  MEDICATIONS  (STANDING):  cefTRIAXone Injectable. 1000 milliGRAM(s) IV Push every 24 hours  chlorhexidine 4% Liquid 1 Application(s) Topical <User Schedule>  dextrose 5%. 1000 milliLiter(s) (100 mL/Hr) IV Continuous <Continuous>  dextrose 5%. 1000 milliLiter(s) (50 mL/Hr) IV Continuous <Continuous>  dextrose 50% Injectable 25 Gram(s) IV Push once  dextrose 50% Injectable 12.5 Gram(s) IV Push once  dextrose 50% Injectable 25 Gram(s) IV Push once  enoxaparin Injectable 40 milliGRAM(s) SubCutaneous every 24 hours  ezetimibe 10 milliGRAM(s) Oral daily  glucagon  Injectable 1 milliGRAM(s) IntraMuscular once  guaiFENesin  milliGRAM(s) Oral every 12 hours  insulin lispro (ADMELOG) corrective regimen sliding scale   SubCutaneous three times a day before meals  magnesium sulfate  IVPB 2 Gram(s) IV Intermittent once  oseltamivir 75 milliGRAM(s) Oral two times a day  pantoprazole    Tablet 40 milliGRAM(s) Oral before breakfast  potassium phosphate IVPB 15 milliMole(s) IV Intermittent once  raloxifene 60 milliGRAM(s) Oral daily  rosuvastatin 20 milliGRAM(s) Oral at bedtime    MEDICATIONS  (PRN):  acetaminophen     Tablet .. 650 milliGRAM(s) Oral every 6 hours PRN Temp greater or equal to 38C (100.4F), Mild Pain (1 - 3)  aluminum hydroxide/magnesium hydroxide/simethicone Suspension 30 milliLiter(s) Oral every 4 hours PRN Dyspepsia  benzocaine/menthol Lozenge 1 Lozenge Oral every 4 hours PRN Sore Throat  dextrose Oral Gel 15 Gram(s) Oral once PRN Blood Glucose LESS THAN 70 milliGRAM(s)/deciliter  loperamide 2 milliGRAM(s) Oral every 6 hours PRN Diarrhea  melatonin 3 milliGRAM(s) Oral at bedtime PRN Insomnia  ondansetron Injectable 4 milliGRAM(s) IV Push every 8 hours PRN Nausea and/or Vomiting  sodium chloride 0.65% Nasal 1 Spray(s) Both Nostrils four times a day PRN Nasal Congestion      ALLERGIES:  Allergies    No Known Allergies    Intolerances        LABS:                        11.0   5.51  )-----------( 238      ( 04 Jan 2025 05:42 )             33.3     01-04    140  |  109[H]  |  7   ----------------------------<  92  3.7   |  24  |  0.46[L]    Ca    7.7[L]      04 Jan 2025 05:42  Phos  1.9     01-04  Mg     1.5     01-04        Urinalysis Basic - ( 04 Jan 2025 05:42 )    Color: x / Appearance: x / SG: x / pH: x  Gluc: 92 mg/dL / Ketone: x  / Bili: x / Urobili: x   Blood: x / Protein: x / Nitrite: x   Leuk Esterase: x / RBC: x / WBC x   Sq Epi: x / Non Sq Epi: x / Bacteria: x      CAPILLARY BLOOD GLUCOSE      POCT Blood Glucose.: 85 mg/dL (03 Jan 2025 23:04)        RADIOLOGY & ADDITIONAL TESTS: Reviewed.     HOSPITALIST PROGRESS NOTE    SUBJECTIVE / INTERVAL HPI: Patient seen and examined at bedside. Still SOB with exertion. Diarrhea has resolved.     ROS: All 10 systems reviewed and found to be negative with the exception of what has been described above.     VITAL SIGNS:  Vital Signs Last 24 Hrs  T(C): 37.3 (04 Jan 2025 06:19), Max: 38.2 (03 Jan 2025 17:00)  T(F): 99.2 (04 Jan 2025 06:19), Max: 100.7 (03 Jan 2025 17:00)  HR: 99 (03 Jan 2025 18:00) (94 - 100)  BP: 125/83 (04 Jan 2025 04:00) (120/89 - 142/95)  BP(mean): 97 (04 Jan 2025 04:00) (93 - 110)  RR: 18 (04 Jan 2025 02:00) (18 - 32)  SpO2: 92% (04 Jan 2025 04:00) (91% - 95%)    Parameters below as of 03 Jan 2025 22:00  Patient On (Oxygen Delivery Method): room air      PHYSICAL EXAM:  General: no acute distress  HEENT: NC/AT; PERRL, anicteric sclera; MMM  Neck: Supple  Cardiovascular: +S1/S2, RRR, no murmurs, rubs, gallops  Respiratory: CTA B/L; no W/R/R  Gastrointestinal: soft, NT/ND; +BSx4  Extremities: WWP; no edema, clubbing or cyanosis  Vascular: 2+ radial, DP/PT pulses B/L  Neurological: AAOx3; no focal deficits    MEDICATIONS:  MEDICATIONS  (STANDING):  cefTRIAXone Injectable. 1000 milliGRAM(s) IV Push every 24 hours  chlorhexidine 4% Liquid 1 Application(s) Topical <User Schedule>  dextrose 5%. 1000 milliLiter(s) (100 mL/Hr) IV Continuous <Continuous>  dextrose 5%. 1000 milliLiter(s) (50 mL/Hr) IV Continuous <Continuous>  dextrose 50% Injectable 25 Gram(s) IV Push once  dextrose 50% Injectable 12.5 Gram(s) IV Push once  dextrose 50% Injectable 25 Gram(s) IV Push once  enoxaparin Injectable 40 milliGRAM(s) SubCutaneous every 24 hours  ezetimibe 10 milliGRAM(s) Oral daily  glucagon  Injectable 1 milliGRAM(s) IntraMuscular once  guaiFENesin  milliGRAM(s) Oral every 12 hours  insulin lispro (ADMELOG) corrective regimen sliding scale   SubCutaneous three times a day before meals  magnesium sulfate  IVPB 2 Gram(s) IV Intermittent once  oseltamivir 75 milliGRAM(s) Oral two times a day  pantoprazole    Tablet 40 milliGRAM(s) Oral before breakfast  potassium phosphate IVPB 15 milliMole(s) IV Intermittent once  raloxifene 60 milliGRAM(s) Oral daily  rosuvastatin 20 milliGRAM(s) Oral at bedtime    MEDICATIONS  (PRN):  acetaminophen     Tablet .. 650 milliGRAM(s) Oral every 6 hours PRN Temp greater or equal to 38C (100.4F), Mild Pain (1 - 3)  aluminum hydroxide/magnesium hydroxide/simethicone Suspension 30 milliLiter(s) Oral every 4 hours PRN Dyspepsia  benzocaine/menthol Lozenge 1 Lozenge Oral every 4 hours PRN Sore Throat  dextrose Oral Gel 15 Gram(s) Oral once PRN Blood Glucose LESS THAN 70 milliGRAM(s)/deciliter  loperamide 2 milliGRAM(s) Oral every 6 hours PRN Diarrhea  melatonin 3 milliGRAM(s) Oral at bedtime PRN Insomnia  ondansetron Injectable 4 milliGRAM(s) IV Push every 8 hours PRN Nausea and/or Vomiting  sodium chloride 0.65% Nasal 1 Spray(s) Both Nostrils four times a day PRN Nasal Congestion      ALLERGIES:  Allergies    No Known Allergies    Intolerances        LABS:                        11.0   5.51  )-----------( 238      ( 04 Jan 2025 05:42 )             33.3     01-04    140  |  109[H]  |  7   ----------------------------<  92  3.7   |  24  |  0.46[L]    Ca    7.7[L]      04 Jan 2025 05:42  Phos  1.9     01-04  Mg     1.5     01-04        Urinalysis Basic - ( 04 Jan 2025 05:42 )    Color: x / Appearance: x / SG: x / pH: x  Gluc: 92 mg/dL / Ketone: x  / Bili: x / Urobili: x   Blood: x / Protein: x / Nitrite: x   Leuk Esterase: x / RBC: x / WBC x   Sq Epi: x / Non Sq Epi: x / Bacteria: x      CAPILLARY BLOOD GLUCOSE      POCT Blood Glucose.: 85 mg/dL (03 Jan 2025 23:04)        RADIOLOGY & ADDITIONAL TESTS: Reviewed.

## 2025-01-05 VITALS — SYSTOLIC BLOOD PRESSURE: 127 MMHG | DIASTOLIC BLOOD PRESSURE: 88 MMHG | OXYGEN SATURATION: 94 %

## 2025-01-05 LAB
ANION GAP SERPL CALC-SCNC: 5 MMOL/L — SIGNIFICANT CHANGE UP (ref 5–17)
BUN SERPL-MCNC: 5 MG/DL — LOW (ref 7–23)
CALCIUM SERPL-MCNC: 7.4 MG/DL — LOW (ref 8.5–10.1)
CHLORIDE SERPL-SCNC: 107 MMOL/L — SIGNIFICANT CHANGE UP (ref 96–108)
CO2 SERPL-SCNC: 24 MMOL/L — SIGNIFICANT CHANGE UP (ref 22–31)
CREAT SERPL-MCNC: 0.44 MG/DL — LOW (ref 0.5–1.3)
EGFR: 109 ML/MIN/1.73M2 — SIGNIFICANT CHANGE UP
GLUCOSE SERPL-MCNC: 92 MG/DL — SIGNIFICANT CHANGE UP (ref 70–99)
MAGNESIUM SERPL-MCNC: 2 MG/DL — SIGNIFICANT CHANGE UP (ref 1.6–2.6)
PHOSPHATE SERPL-MCNC: 2.1 MG/DL — LOW (ref 2.5–4.5)
POTASSIUM SERPL-MCNC: 3.5 MMOL/L — SIGNIFICANT CHANGE UP (ref 3.5–5.3)
POTASSIUM SERPL-SCNC: 3.5 MMOL/L — SIGNIFICANT CHANGE UP (ref 3.5–5.3)
SODIUM SERPL-SCNC: 136 MMOL/L — SIGNIFICANT CHANGE UP (ref 135–145)

## 2025-01-05 PROCEDURE — 99239 HOSP IP/OBS DSCHRG MGMT >30: CPT

## 2025-01-05 RX ORDER — SOD PHOS DI, MONO/K PHOS MONO 250 MG
1 TABLET ORAL ONCE
Refills: 0 | Status: COMPLETED | OUTPATIENT
Start: 2025-01-05 | End: 2025-01-05

## 2025-01-05 RX ORDER — RALOXIFENE HYDROCHLORIDE 60 MG/1
1 TABLET ORAL
Qty: 0 | Refills: 0 | DISCHARGE
Start: 2025-01-05

## 2025-01-05 RX ADMIN — Medication 1 PACKET(S): at 11:36

## 2025-01-05 RX ADMIN — EZETIMIBE 10 MILLIGRAM(S): 10 TABLET ORAL at 11:35

## 2025-01-05 RX ADMIN — Medication 600 MILLIGRAM(S): at 11:34

## 2025-01-05 RX ADMIN — PANTOPRAZOLE 40 MILLIGRAM(S): 40 TABLET, DELAYED RELEASE ORAL at 04:59

## 2025-01-05 RX ADMIN — OSELTAMIVIR 75 MILLIGRAM(S): 75 CAPSULE ORAL at 11:35

## 2025-01-05 RX ADMIN — CEFTRIAXONE SODIUM 1000 MILLIGRAM(S): 1 INJECTION, POWDER, FOR SOLUTION INTRAMUSCULAR; INTRAVENOUS at 11:34

## 2025-01-05 RX ADMIN — RALOXIFENE HYDROCHLORIDE 60 MILLIGRAM(S): 60 TABLET ORAL at 11:34

## 2025-01-05 RX ADMIN — ENOXAPARIN SODIUM 40 MILLIGRAM(S): 60 INJECTION INTRAVENOUS; SUBCUTANEOUS at 04:58

## 2025-01-05 RX ADMIN — CHLORHEXIDINE GLUCONATE 1 APPLICATION(S): 1.2 RINSE ORAL at 11:43

## 2025-01-05 NOTE — DISCHARGE NOTE NURSING/CASE MANAGEMENT/SOCIAL WORK - NSDCPEFALRISK_GEN_ALL_CORE
For information on Fall & Injury Prevention, visit: [email or web address] OR  [email or web address] OR  https://www.cdc.gov/steadi/patient.html

## 2025-01-05 NOTE — DISCHARGE NOTE PROVIDER - NSDCFUADDAPPT_GEN_ALL_CORE_FT
APPTS ARE READY TO BE MADE: [X] YES    Best Family or Patient Contact (if needed):    Additional Information about above appointments (if needed):    1:  2:  3:  APPTS ARE READY TO BE MADE: [X] YES    Best Family or Patient Contact (if needed):    Additional Information about above appointments (if needed):    1:  2:  3:     Patient refused to provide their date-of-birth; unable to proceed with referral information.

## 2025-01-05 NOTE — DISCHARGE NOTE NURSING/CASE MANAGEMENT/SOCIAL WORK - FINANCIAL ASSISTANCE
[facility] provides services at a reduced cost to those who are determined to be eligible through [facility]’s financial assistance program. Information regarding [facility]’s financial assistance program can be found by going to [email or web address] or by calling [phone].

## 2025-01-05 NOTE — DISCHARGE NOTE PROVIDER - NSDCFUSCHEDAPPT_GEN_ALL_CORE_FT
[facility]  [facility] [address]  Scheduled Appointment: 01/15/2025     [facility]  [address]  Scheduled Appointment: 02/12/2025

## 2025-01-05 NOTE — DISCHARGE NOTE PROVIDER - HOSPITAL COURSE
Admission HPI: 64-year-old female PMH HLD, osteoarthritis, osteopenia, presented to the emergency department on 1/1/15 for 5 days of URI symptoms, nausea, vomiting, and diarrhea. Today, her SpO2 on her home pulse ox was low, so she went to urgent care and was then sent to the ER. On arrival, patient found to have SpO2 78% on RA. Patient found to have multifocal PNA, and to be influenza A positive. Patient ultimately started on BiPAP for hypoxia and work of breathing.     Patient showed clinical improvement s/p Tamiflu, and antibiotics for superimposed PNA. Seen be PT. No need for home O2 (evaluation done). Completed antibiotics. Will follow up with PCP after discharge.     Hospital course (by problem):   # Multifocal PNA   # Influenza PNA   # Acute hypoxic respiratory failure   # Hypokalemia  #Hypomagnesemia  #Hypophosphatemia   # Prediabetes    -Initially admitted to SICU requiring BiPAP for increased WOB and hypoxia  -Now weaned to room air  -s/p course of Ceftriaxone for pneumonia  -Electrolyte abnormalities may be 2/2 diarrhea. Replete prn. Improving   -s/p Tamiflu for Influenza A  -Avoiding steroids in the setting of influenza PNA  -Hold metformin, continue ISS  -O2 eval -- no need for home O2  -PT eval -- No home needs    Patient was discharged to: Home no needs    Physical exam at the time of discharge:  LOS: 4d    VITALS:   T(C): 37.3 (01-05-25 @ 09:24), Max: 37.6 (01-05-25 @ 05:00)  HR: 97 (01-05-25 @ 06:00) (90 - 97)  BP: 131/87 (01-05-25 @ 06:00) (122/76 - 146/88)  RR: 18 (01-05-25 @ 06:00) (17 - 18)  SpO2: 93% (01-05-25 @ 06:55) (89% - 96%)    PHYSICAL EXAM:  GENERAL: NAD  HEAD:  Atraumatic, Normocephalic  EYES: EOMI, PERRLA, conjunctiva and sclera clear  ENMT: No tonsillar erythema, exudates, or enlargement; Moist mucous membranes  NECK: Supple, No JVD, Normal thyroid  HEART: Regular rate and rhythm; No murmurs, rubs, or gallops  RESPIRATORY: Unlabored respirations. CTA B/L, No W/R/R  ABDOMEN: Soft, Nontender, Nondistended; Bowel sounds present  NEUROLOGY: A&Ox3, nonfocal, moving all extremities  EXTREMITIES:  2+ Peripheral Pulses, No clubbing, cyanosis, or edema  SKIN: warm, dry, normal color, no rash or abnormal lesions

## 2025-01-05 NOTE — DISCHARGE NOTE PROVIDER - NSDCMRMEDTOKEN_GEN_ALL_CORE_FT
cholecalciferol 25 mcg (1000 intl units) oral tablet: 1 tab(s) orally once a day  metFORMIN 500 mg oral tablet: 1 tab(s) orally 2 times a day  Metoprolol Succinate ER 25 mg oral tablet, extended release: 1 tab(s) orally once a day  pantoprazole 40 mg oral delayed release tablet: 1 tab(s) orally once a day  raloxifene 60 mg oral tablet: 1 tab(s) orally once a day  rosuvastatin 10 mg oral tablet: 1 tab(s) orally once a day  Zetia 10 mg oral tablet: 1 tab(s) orally once a day

## 2025-01-05 NOTE — DISCHARGE NOTE NURSING/CASE MANAGEMENT/SOCIAL WORK - PATIENT PORTAL LINK FT
You can access the FollowMyHealth Patient Portal offered by [facility] by registering at the following website: [email or web address]. By joining [employer]’s FollowMyHealth portal, you will also be able to view your health information using other applications (apps) compatible with our system.

## 2025-01-05 NOTE — DISCHARGE NOTE PROVIDER - CARE PROVIDER_API CALL
London Burleson  Cardiovascular Disease  [address]  [place] [address]  Phone: [phone]  Fax: [phone]  Follow Up Time: 2 weeks

## 2025-01-05 NOTE — DISCHARGE NOTE PROVIDER - NSDCCPCAREPLAN_GEN_ALL_CORE_FT
PRINCIPAL DISCHARGE DIAGNOSIS  Diagnosis: Pneumonia due to influenza A virus  Assessment and Plan of Treatment: You were treated in the hospital for Influenza and superimposed Pneumonia.   Continue supportive care at home. Follow up with your Primary Care Doctor within 2 weeks.     [FreeTextEntry1] : Trsiomy 21; hypothyroidism; ALL; CH [TWNoteComboBox1] : [place]

## 2025-01-05 NOTE — DISCHARGE NOTE PROVIDER - NSDCCPTREATMENT_GEN_ALL_CORE_FT
PRINCIPAL PROCEDURE  Procedure: CT chest wo con  Findings and Treatment: FINDINGS:PULMONARY ARTERIES:  There is suboptimal timing of the contrast bolus. There is opacification   through the level of the lobar and segmental pulmonary arteries. There is   moderate respiratory motion artifact. No filling defects are present to   the level of the lobar and segmental pulmonary arteries. Evaluation of   more distal subsegmental pulmonary arteries is limited by the suboptimal   contrast bolus.The main pulmonary artery is normal in size.  The heart   size is within normal limits.   The right heart is not enlarged.  MEDIASTINUM:  The thyroid and thoracic inlet are normal. There are multiple   subcentimeter mediastinal lymph nodes, likely reactive.  No pericardial   effusion is present. The esophagus is normal.  LUNGS/PLEURA:  Central tracheobronchial tree is patent. There are airway associated   groundglass opacity and bibasilar consolidations. There are small   bilateral pleural effusions.  LIVER: The liver parenchyma is mildly enlarged measuring up to 15.8 cm in   midline. The liver demonstrates normal attenuation.  No focal liver   lesion is noted. The portal vein is patent.  BILIARY TREE: There is no intrahepatic or extrahepatic biliary ductal   dilatation.  GALLBLADDER: The gallbladder is physiologically distended. There are no   radiopaque gallstones.  PANCREAS: The pancreas is normal in size and contour. There is no   pancreatic ductal dilatation.  ADRENAL GLANDS: The adrenal glands are unremarkable.  KIDNEYS/URETERS: The kidneys are symmetric in size. Both kidneys   demonstrate symmetric uptake of intravenous contrast.  There is no   hydronephrosis.  BONES: There is moderate dextroscoliosis with apex at the level of L2-L3   vertebral bodies. There are multilevel degenerative changes of thoracic   spine.  IMPRESSION:  No pulmonary embolism through the level of the lobar and segmental   pulmonary arteries. Evaluation of more distal subsegmental pulmonary   arteries is limited by suboptimal contrast bolus timing  Bilateral airway associated groundglass opacities in a

## 2025-01-06 LAB
CULTURE RESULTS: SIGNIFICANT CHANGE UP
CULTURE RESULTS: SIGNIFICANT CHANGE UP
SPECIMEN SOURCE: SIGNIFICANT CHANGE UP
SPECIMEN SOURCE: SIGNIFICANT CHANGE UP

## 2025-01-15 ENCOUNTER — APPOINTMENT (OUTPATIENT)
Dept: ENDOCRINOLOGY | Facility: CLINIC | Age: 65
End: 2025-01-15
Payer: COMMERCIAL

## 2025-01-15 DIAGNOSIS — M81.0 AGE-RELATED OSTEOPOROSIS W/OUT CURRENT PATHOLOGICAL FRACTURE: ICD-10-CM

## 2025-01-15 PROCEDURE — 96372 THER/PROPH/DIAG INJ SC/IM: CPT

## 2025-01-15 RX ORDER — ROMOSOZUMAB-AQQG 105 MG/1.17ML
105 INJECTION, SOLUTION SUBCUTANEOUS
Qty: 0 | Refills: 0 | Status: COMPLETED | OUTPATIENT
Start: 2025-01-08

## 2025-01-16 DIAGNOSIS — E83.42 HYPOMAGNESEMIA: ICD-10-CM

## 2025-01-16 DIAGNOSIS — E87.6 HYPOKALEMIA: ICD-10-CM

## 2025-01-16 DIAGNOSIS — R73.03 PREDIABETES: ICD-10-CM

## 2025-01-16 DIAGNOSIS — E83.39 OTHER DISORDERS OF PHOSPHORUS METABOLISM: ICD-10-CM

## 2025-01-16 DIAGNOSIS — Z79.4 LONG TERM (CURRENT) USE OF INSULIN: ICD-10-CM

## 2025-01-16 DIAGNOSIS — Z90.710 ACQUIRED ABSENCE OF BOTH CERVIX AND UTERUS: ICD-10-CM

## 2025-01-16 DIAGNOSIS — Z79.84 LONG TERM (CURRENT) USE OF ORAL HYPOGLYCEMIC DRUGS: ICD-10-CM

## 2025-01-16 DIAGNOSIS — J96.01 ACUTE RESPIRATORY FAILURE WITH HYPOXIA: ICD-10-CM

## 2025-01-16 DIAGNOSIS — J13 PNEUMONIA DUE TO STREPTOCOCCUS PNEUMONIAE: ICD-10-CM

## 2025-01-16 DIAGNOSIS — J09.X1 INFLUENZA DUE TO IDENTIFIED NOVEL INFLUENZA A VIRUS WITH PNEUMONIA: ICD-10-CM

## 2025-01-16 DIAGNOSIS — E78.5 HYPERLIPIDEMIA, UNSPECIFIED: ICD-10-CM

## 2025-01-16 DIAGNOSIS — E66.9 OBESITY, UNSPECIFIED: ICD-10-CM

## 2025-01-16 DIAGNOSIS — A40.3 SEPSIS DUE TO STREPTOCOCCUS PNEUMONIAE: ICD-10-CM

## 2025-01-16 DIAGNOSIS — J12.9 VIRAL PNEUMONIA, UNSPECIFIED: ICD-10-CM

## 2025-01-16 DIAGNOSIS — K21.9 GASTRO-ESOPHAGEAL REFLUX DISEASE WITHOUT ESOPHAGITIS: ICD-10-CM

## 2025-02-12 ENCOUNTER — APPOINTMENT (OUTPATIENT)
Dept: ENDOCRINOLOGY | Facility: CLINIC | Age: 65
End: 2025-02-12
Payer: COMMERCIAL

## 2025-02-12 DIAGNOSIS — M81.0 AGE-RELATED OSTEOPOROSIS W/OUT CURRENT PATHOLOGICAL FRACTURE: ICD-10-CM

## 2025-02-12 PROCEDURE — 96372 THER/PROPH/DIAG INJ SC/IM: CPT

## 2025-02-12 RX ORDER — ROMOSOZUMAB-AQQG 105 MG/1.17ML
105 INJECTION, SOLUTION SUBCUTANEOUS
Qty: 0 | Refills: 0 | Status: COMPLETED | OUTPATIENT
Start: 2025-02-10

## 2025-02-12 NOTE — ASU PATIENT PROFILE, ADULT - FALLEN IN THE PAST
Spiritual Plan of Care    Pt Name: Tonny Payne  Pt : 1994  Date: 2025    Visit Type: In person  Referral Source: Interdisciplinary team  Reason for Visit: Emergency Code (ED Trauma)  Visited With: Patient not available  Length of Visit: 15 minutes  Spiritual Care Consult Needed: Spiritual Care eval completed  Taxonomy:    Intended Effects: Demonstrate caring and concern  Methods: Collaborate with care team member, Offer support  Interventions: Acknowledge current situation, Respond as  to a defined crisis event  Patient Affect at Time of Visit: Alert  Patient Assessment: Coping   Spiritual Plan of Care: Follow up if requested     responded to ED Trauma alert. No  intervention needed at this time.  support available prn #[id number].    
no

## 2025-03-13 ENCOUNTER — APPOINTMENT (OUTPATIENT)
Dept: ENDOCRINOLOGY | Facility: CLINIC | Age: 65
End: 2025-03-13
Payer: COMMERCIAL

## 2025-03-13 DIAGNOSIS — M81.0 AGE-RELATED OSTEOPOROSIS W/OUT CURRENT PATHOLOGICAL FRACTURE: ICD-10-CM

## 2025-03-13 PROCEDURE — 96372 THER/PROPH/DIAG INJ SC/IM: CPT

## 2025-03-13 RX ORDER — LOSARTAN POTASSIUM 25 MG/1
25 TABLET, FILM COATED ORAL DAILY
Refills: 0 | Status: ACTIVE | COMMUNITY
Start: 2025-03-13

## 2025-03-13 RX ORDER — METOPROLOL SUCCINATE 50 MG/1
50 TABLET, EXTENDED RELEASE ORAL DAILY
Refills: 0 | Status: ACTIVE | COMMUNITY
Start: 2025-03-13

## 2025-03-13 RX ORDER — ROMOSOZUMAB-AQQG 105 MG/1.17ML
105 INJECTION, SOLUTION SUBCUTANEOUS
Qty: 0 | Refills: 0 | Status: COMPLETED | OUTPATIENT
Start: 2025-03-10

## 2025-04-15 ENCOUNTER — NON-APPOINTMENT (OUTPATIENT)
Age: 65
End: 2025-04-15

## 2025-04-16 ENCOUNTER — APPOINTMENT (OUTPATIENT)
Dept: OBGYN | Facility: CLINIC | Age: 65
End: 2025-04-16

## 2025-04-17 ENCOUNTER — APPOINTMENT (OUTPATIENT)
Dept: ENDOCRINOLOGY | Facility: CLINIC | Age: 65
End: 2025-04-17
Payer: COMMERCIAL

## 2025-04-17 DIAGNOSIS — M81.0 AGE-RELATED OSTEOPOROSIS W/OUT CURRENT PATHOLOGICAL FRACTURE: ICD-10-CM

## 2025-04-17 PROCEDURE — 96372 THER/PROPH/DIAG INJ SC/IM: CPT

## 2025-04-17 RX ORDER — ROMOSOZUMAB-AQQG 105 MG/1.17ML
105 INJECTION, SOLUTION SUBCUTANEOUS
Qty: 0 | Refills: 0 | Status: COMPLETED | OUTPATIENT
Start: 2025-04-16

## 2025-05-01 ENCOUNTER — RX RENEWAL (OUTPATIENT)
Age: 65
End: 2025-05-01

## 2025-05-05 ENCOUNTER — APPOINTMENT (OUTPATIENT)
Dept: DERMATOLOGY | Facility: CLINIC | Age: 65
End: 2025-05-05
Payer: COMMERCIAL

## 2025-05-05 DIAGNOSIS — L65.9 NONSCARRING HAIR LOSS, UNSPECIFIED: ICD-10-CM

## 2025-05-05 DIAGNOSIS — L29.9 PRURITUS, UNSPECIFIED: ICD-10-CM

## 2025-05-05 PROCEDURE — 99204 OFFICE O/P NEW MOD 45 MIN: CPT

## 2025-05-14 ENCOUNTER — APPOINTMENT (OUTPATIENT)
Dept: ENDOCRINOLOGY | Facility: CLINIC | Age: 65
End: 2025-05-14

## 2025-05-16 ENCOUNTER — APPOINTMENT (OUTPATIENT)
Dept: ENDOCRINOLOGY | Facility: CLINIC | Age: 65
End: 2025-05-16
Payer: COMMERCIAL

## 2025-05-16 DIAGNOSIS — M81.0 AGE-RELATED OSTEOPOROSIS W/OUT CURRENT PATHOLOGICAL FRACTURE: ICD-10-CM

## 2025-05-16 PROCEDURE — 96372 THER/PROPH/DIAG INJ SC/IM: CPT

## 2025-05-16 RX ORDER — ROMOSOZUMAB-AQQG 105 MG/1.17ML
105 INJECTION, SOLUTION SUBCUTANEOUS
Qty: 0 | Refills: 0 | Status: COMPLETED | OUTPATIENT
Start: 2025-05-12

## 2025-05-20 ENCOUNTER — APPOINTMENT (OUTPATIENT)
Dept: OBGYN | Facility: CLINIC | Age: 65
End: 2025-05-20

## 2025-06-17 ENCOUNTER — APPOINTMENT (OUTPATIENT)
Dept: ENDOCRINOLOGY | Facility: CLINIC | Age: 65
End: 2025-06-17
Payer: COMMERCIAL

## 2025-06-17 VITALS
BODY MASS INDEX: 24.98 KG/M2 | OXYGEN SATURATION: 98 % | WEIGHT: 119 LBS | HEIGHT: 58 IN | SYSTOLIC BLOOD PRESSURE: 130 MMHG | HEART RATE: 84 BPM | DIASTOLIC BLOOD PRESSURE: 80 MMHG

## 2025-06-17 PROCEDURE — 96372 THER/PROPH/DIAG INJ SC/IM: CPT

## 2025-06-17 PROCEDURE — 99214 OFFICE O/P EST MOD 30 MIN: CPT | Mod: 25

## 2025-06-17 RX ORDER — ROMOSOZUMAB-AQQG 105 MG/1.17ML
105 INJECTION, SOLUTION SUBCUTANEOUS
Refills: 0 | Status: COMPLETED | OUTPATIENT
Start: 2025-06-17

## 2025-06-17 RX ADMIN — ROMOSOZUMAB-AQQG 0 MG/1.17ML: 105 INJECTION, SOLUTION SUBCUTANEOUS at 00:00

## 2025-06-19 ENCOUNTER — APPOINTMENT (OUTPATIENT)
Dept: OBGYN | Facility: CLINIC | Age: 65
End: 2025-06-19
Payer: COMMERCIAL

## 2025-06-19 PROBLEM — Z97.4 HEARING AID WORN: Status: ACTIVE | Noted: 2025-06-19

## 2025-06-19 PROBLEM — Z87.01 HISTORY OF PNEUMONIA: Status: RESOLVED | Noted: 2025-06-19 | Resolved: 2025-06-19

## 2025-06-19 PROBLEM — Z78.9 CAFFEINE USE: Status: ACTIVE | Noted: 2025-06-19

## 2025-06-19 PROBLEM — N95.2 ATROPHIC VAGINITIS: Status: ACTIVE | Noted: 2025-06-19

## 2025-06-19 PROBLEM — Z01.419 WELL WOMAN EXAM WITH ROUTINE GYNECOLOGICAL EXAM: Status: ACTIVE | Noted: 2025-06-19

## 2025-06-19 PROBLEM — N76.2 VULVITIS: Status: ACTIVE | Noted: 2025-06-19

## 2025-06-19 PROCEDURE — 99396 PREV VISIT EST AGE 40-64: CPT

## 2025-06-19 RX ORDER — ESTRADIOL 10 UG/1
10 TABLET VAGINAL
Qty: 24 | Refills: 3 | Status: ACTIVE | COMMUNITY
Start: 2025-06-19 | End: 1900-01-01

## 2025-06-19 RX ORDER — CLOBETASOL PROPIONATE 0.5 MG/G
0.05 OINTMENT TOPICAL TWICE DAILY
Qty: 1 | Refills: 3 | Status: ACTIVE | COMMUNITY
Start: 2025-06-19 | End: 1900-01-01

## 2025-06-23 LAB — HPV HIGH+LOW RISK DNA PNL CVX: NOT DETECTED

## 2025-06-24 PROBLEM — B37.31 YEAST VAGINITIS: Status: ACTIVE | Noted: 2025-06-24 | Resolved: 2025-07-24

## 2025-06-24 LAB — CYTOLOGY CVX/VAG DOC THIN PREP: ABNORMAL

## 2025-06-24 RX ORDER — FLUCONAZOLE 150 MG/1
150 TABLET ORAL
Qty: 2 | Refills: 0 | Status: ACTIVE | COMMUNITY
Start: 2025-06-24 | End: 1900-01-01

## 2025-07-14 ENCOUNTER — EMERGENCY (EMERGENCY)
Facility: HOSPITAL | Age: 65
LOS: 0 days | Discharge: HOME | End: 2025-07-14
Attending: EMERGENCY MEDICINE
Payer: COMMERCIAL

## 2025-07-14 VITALS
DIASTOLIC BLOOD PRESSURE: 61 MMHG | HEART RATE: 75 BPM | TEMPERATURE: 98 F | RESPIRATION RATE: 18 BRPM | SYSTOLIC BLOOD PRESSURE: 113 MMHG | OXYGEN SATURATION: 97 %

## 2025-07-14 VITALS
OXYGEN SATURATION: 98 % | HEART RATE: 92 BPM | SYSTOLIC BLOOD PRESSURE: 110 MMHG | RESPIRATION RATE: 20 BRPM | DIASTOLIC BLOOD PRESSURE: 49 MMHG | WEIGHT: 120.15 LBS | TEMPERATURE: 99 F

## 2025-07-14 DIAGNOSIS — K52.9 NONINFECTIVE GASTROENTERITIS AND COLITIS, UNSPECIFIED: ICD-10-CM

## 2025-07-14 DIAGNOSIS — Z98.891 HISTORY OF UTERINE SCAR FROM PREVIOUS SURGERY: Chronic | ICD-10-CM

## 2025-07-14 DIAGNOSIS — Z98.890 OTHER SPECIFIED POSTPROCEDURAL STATES: Chronic | ICD-10-CM

## 2025-07-14 DIAGNOSIS — K62.89 OTHER SPECIFIED DISEASES OF ANUS AND RECTUM: ICD-10-CM

## 2025-07-14 DIAGNOSIS — Z90.710 ACQUIRED ABSENCE OF BOTH CERVIX AND UTERUS: Chronic | ICD-10-CM

## 2025-07-14 LAB
ALBUMIN SERPL ELPH-MCNC: 4.1 G/DL — SIGNIFICANT CHANGE UP (ref 3.3–5)
ALP SERPL-CCNC: 53 U/L — SIGNIFICANT CHANGE UP (ref 40–120)
ALT FLD-CCNC: 23 U/L — SIGNIFICANT CHANGE UP (ref 12–78)
ANION GAP SERPL CALC-SCNC: 8 MMOL/L — SIGNIFICANT CHANGE UP (ref 5–17)
AST SERPL-CCNC: 18 U/L — SIGNIFICANT CHANGE UP (ref 15–37)
BASOPHILS # BLD AUTO: 0.02 K/UL — SIGNIFICANT CHANGE UP (ref 0–0.2)
BASOPHILS NFR BLD AUTO: 0.2 % — SIGNIFICANT CHANGE UP (ref 0–2)
BILIRUB SERPL-MCNC: 1 MG/DL — SIGNIFICANT CHANGE UP (ref 0.2–1.2)
BUN SERPL-MCNC: 15 MG/DL — SIGNIFICANT CHANGE UP (ref 7–23)
CALCIUM SERPL-MCNC: 9.7 MG/DL — SIGNIFICANT CHANGE UP (ref 8.5–10.1)
CHLORIDE SERPL-SCNC: 99 MMOL/L — SIGNIFICANT CHANGE UP (ref 96–108)
CO2 SERPL-SCNC: 29 MMOL/L — SIGNIFICANT CHANGE UP (ref 22–31)
CREAT SERPL-MCNC: 0.71 MG/DL — SIGNIFICANT CHANGE UP (ref 0.5–1.3)
EGFR: 95 ML/MIN/1.73M2 — SIGNIFICANT CHANGE UP
EGFR: 95 ML/MIN/1.73M2 — SIGNIFICANT CHANGE UP
EOSINOPHIL # BLD AUTO: 0.01 K/UL — SIGNIFICANT CHANGE UP (ref 0–0.5)
EOSINOPHIL NFR BLD AUTO: 0.1 % — SIGNIFICANT CHANGE UP (ref 0–6)
FLUAV AG NPH QL: SIGNIFICANT CHANGE UP
FLUBV AG NPH QL: SIGNIFICANT CHANGE UP
GLUCOSE SERPL-MCNC: 90 MG/DL — SIGNIFICANT CHANGE UP (ref 70–99)
HCT VFR BLD CALC: 41.7 % — SIGNIFICANT CHANGE UP (ref 34.5–45)
HGB BLD-MCNC: 13.6 G/DL — SIGNIFICANT CHANGE UP (ref 11.5–15.5)
IMM GRANULOCYTES # BLD AUTO: 0.01 K/UL — SIGNIFICANT CHANGE UP (ref 0–0.07)
IMM GRANULOCYTES NFR BLD AUTO: 0.1 % — SIGNIFICANT CHANGE UP (ref 0–0.9)
LIDOCAIN IGE QN: 28 U/L — SIGNIFICANT CHANGE UP (ref 13–75)
LYMPHOCYTES # BLD AUTO: 1.44 K/UL — SIGNIFICANT CHANGE UP (ref 1–3.3)
LYMPHOCYTES NFR BLD AUTO: 17.1 % — SIGNIFICANT CHANGE UP (ref 13–44)
MCHC RBC-ENTMCNC: 29.7 PG — SIGNIFICANT CHANGE UP (ref 27–34)
MCHC RBC-ENTMCNC: 32.6 G/DL — SIGNIFICANT CHANGE UP (ref 32–36)
MCV RBC AUTO: 91 FL — SIGNIFICANT CHANGE UP (ref 80–100)
MONOCYTES # BLD AUTO: 0.4 K/UL — SIGNIFICANT CHANGE UP (ref 0–0.9)
MONOCYTES NFR BLD AUTO: 4.7 % — SIGNIFICANT CHANGE UP (ref 2–14)
NEUTROPHILS # BLD AUTO: 6.55 K/UL — SIGNIFICANT CHANGE UP (ref 1.8–7.4)
NEUTROPHILS NFR BLD AUTO: 77.8 % — HIGH (ref 43–77)
NRBC # BLD AUTO: 0 K/UL — SIGNIFICANT CHANGE UP (ref 0–0)
NRBC # FLD: 0 K/UL — SIGNIFICANT CHANGE UP (ref 0–0)
NRBC BLD AUTO-RTO: 0 /100 WBCS — SIGNIFICANT CHANGE UP (ref 0–0)
PLATELET # BLD AUTO: 179 K/UL — SIGNIFICANT CHANGE UP (ref 150–400)
PMV BLD: 13.3 FL — HIGH (ref 7–13)
POTASSIUM SERPL-MCNC: 3 MMOL/L — LOW (ref 3.5–5.3)
POTASSIUM SERPL-SCNC: 3 MMOL/L — LOW (ref 3.5–5.3)
PROT SERPL-MCNC: 7.9 GM/DL — SIGNIFICANT CHANGE UP (ref 6–8.3)
RBC # BLD: 4.58 M/UL — SIGNIFICANT CHANGE UP (ref 3.8–5.2)
RBC # FLD: 12.3 % — SIGNIFICANT CHANGE UP (ref 10.3–14.5)
RSV RNA NPH QL NAA+NON-PROBE: SIGNIFICANT CHANGE UP
SARS-COV-2 RNA SPEC QL NAA+PROBE: SIGNIFICANT CHANGE UP
SODIUM SERPL-SCNC: 136 MMOL/L — SIGNIFICANT CHANGE UP (ref 135–145)
SOURCE RESPIRATORY: SIGNIFICANT CHANGE UP
WBC # BLD: 8.43 K/UL — SIGNIFICANT CHANGE UP (ref 3.8–10.5)
WBC # FLD AUTO: 8.43 K/UL — SIGNIFICANT CHANGE UP (ref 3.8–10.5)

## 2025-07-14 PROCEDURE — 87637 SARSCOV2&INF A&B&RSV AMP PRB: CPT

## 2025-07-14 PROCEDURE — 74177 CT ABD & PELVIS W/CONTRAST: CPT | Mod: 26

## 2025-07-14 PROCEDURE — 99284 EMERGENCY DEPT VISIT MOD MDM: CPT | Mod: 25

## 2025-07-14 PROCEDURE — 74177 CT ABD & PELVIS W/CONTRAST: CPT

## 2025-07-14 PROCEDURE — 83690 ASSAY OF LIPASE: CPT

## 2025-07-14 PROCEDURE — 96375 TX/PRO/DX INJ NEW DRUG ADDON: CPT

## 2025-07-14 PROCEDURE — 80053 COMPREHEN METABOLIC PANEL: CPT

## 2025-07-14 PROCEDURE — 36415 COLL VENOUS BLD VENIPUNCTURE: CPT

## 2025-07-14 PROCEDURE — 85025 COMPLETE CBC W/AUTO DIFF WBC: CPT

## 2025-07-14 PROCEDURE — 96374 THER/PROPH/DIAG INJ IV PUSH: CPT | Mod: XU

## 2025-07-14 PROCEDURE — 99285 EMERGENCY DEPT VISIT HI MDM: CPT

## 2025-07-14 RX ORDER — AMOXICILLIN AND CLAVULANATE POTASSIUM 500; 125 MG/1; MG/1
1 TABLET, FILM COATED ORAL ONCE
Refills: 0 | Status: COMPLETED | OUTPATIENT
Start: 2025-07-14 | End: 2025-07-14

## 2025-07-14 RX ORDER — AMOXICILLIN AND CLAVULANATE POTASSIUM 500; 125 MG/1; MG/1
1 TABLET, FILM COATED ORAL
Qty: 30 | Refills: 0
Start: 2025-07-14 | End: 2025-07-23

## 2025-07-14 RX ORDER — ACETAMINOPHEN 500 MG/5ML
1000 LIQUID (ML) ORAL ONCE
Refills: 0 | Status: COMPLETED | OUTPATIENT
Start: 2025-07-14 | End: 2025-07-14

## 2025-07-14 RX ORDER — ONDANSETRON HCL/PF 4 MG/2 ML
4 VIAL (ML) INJECTION ONCE
Refills: 0 | Status: COMPLETED | OUTPATIENT
Start: 2025-07-14 | End: 2025-07-14

## 2025-07-14 RX ADMIN — AMOXICILLIN AND CLAVULANATE POTASSIUM 1 TABLET(S): 500; 125 TABLET, FILM COATED ORAL at 21:51

## 2025-07-14 RX ADMIN — Medication 400 MILLIGRAM(S): at 20:06

## 2025-07-14 RX ADMIN — Medication 4 MILLIGRAM(S): at 20:06

## 2025-07-14 RX ADMIN — Medication 1000 MILLILITER(S): at 20:06

## 2025-07-14 NOTE — ED ADULT NURSE NOTE - NSFALLHARMRISKINTERV_ED_ALL_ED

## 2025-07-14 NOTE — ED PROVIDER NOTE - OBJECTIVE STATEMENT
65-year-old female with a past medical history of hyperlipidemia, osteoarthritis, osteopenia, presents with chief complaint of abdominal pain.  Patient states that it started last 2 days.  Complains of abdominal pain, diffuse, worse right lower quadrant, also endorses nausea, vomiting, body aches.  Thought that she may have had viral infection, went to urgent care, and then was sent to ED for further evaluation.  No medication taken prior to arrival.

## 2025-07-14 NOTE — ED PROVIDER NOTE - CLINICAL SUMMARY MEDICAL DECISION MAKING FREE TEXT BOX
Labs nonactionable.  CT scan shows a proctocolitis, no abscess, no perforation.  Patient will repeat evaluation.  Will start on antibiotic, first dose in the ED, rest sent to pharmacy.  Recommend close outpatient follow-up in the next week.  Strict return precautions given for any worsening.  Patient verbalized understanding and agreed to plan.

## 2025-07-14 NOTE — ED PROVIDER NOTE - PATIENT PORTAL LINK FT
You can access the FollowMyHealth Patient Portal offered by [facility] by registering at the following website: [email or web address]. By joining [employer]’s FollowMyHealth portal, you will also be able to view your health information using other applications (apps) compatible with our system.

## 2025-07-14 NOTE — ED ADULT TRIAGE NOTE - CHIEF COMPLAINT QUOTE
pt ambulatory to ED for RLQ pain started yesterday. reports n/v/d. went to UC today and reports tenderness upon palpation. subjective fever this morning at home and she took advil. no fever in triage. denies GI pmhx.

## 2025-07-14 NOTE — ED PROVIDER NOTE - NSFOLLOWUPINSTRUCTIONS_ED_ALL_ED_FT
Proctitis  Body outline showing the digestive organs and noting the rectum.  Proctitis is inflammation of the lining of the rectum. The rectum is at the end of the large intestine. The inflammation can cause pain and discomfort. It may be short-term and sudden (acute) or a long-term (chronic) problem.    What are the causes?  Proctitis may be caused by:  Sexually transmitted infections (STIs).  Infection.  Trauma or injury to the rectum or to the opening of the butt (anus).  Ulcerative colitis or Crohn's disease.  Radiation therapy near the rectum.  Antibiotics.  What are the signs or symptoms?  Symptoms of this condition include:  The sudden need to poop. This may be uncomfortable. It may happen often.  Pain in the anus or rectum.  Bleeding from the rectum.  Pain or cramping in the abdomen.  Feeling like your rectum is full.  Pus or mucus coming from the anus.  Diarrhea or lots of soft, loose poop (stool).  Constipation or pain when pooping.  How is this diagnosed?  This condition may be diagnosed based on your medical history and a physical exam.    You may also have tests, such as:  A test to check for an STI.  Blood tests.  Poop tests.  A culture test. This is when a sample is taken from your rectum to look for bacteria.  Anoscopy. This is a procedure to look at the anal canal.  Colonoscopy or sigmoidoscopy. These are procedures to look at the large intestine.  How is this treated?  Treatment for proctitis depends on the cause. Treatment may include:  Medicines to:  Treat inflammation. These may be ointments or foams you can put on your rectum. They may also be enemas or medicines you can put in your rectum (suppositories).  Treat infection or fight bacteria, such as antibiotics.  Fight a virus. These medicines are called antivirals.  Help with diarrhea, hard poop, and pain.  Reduce the activity of the body's disease-fighting system (immune system).  Supplements.  Staying away from the activity that caused trauma to the rectum.  Heat or laser therapy. This may be used if bleeding does not go away.  A procedure to make a narrow rectum wider.  Surgery to fix the lining of the rectum. This is rare.  If the proctitis was caused by an STI, you may need to get tested again for infection 3 months after treatment.    Follow these instructions at home:  Medicines    Take over-the-counter and prescription medicines only as told by your health care provider.  If you were prescribed antibiotics, take them as told by your provider. Do not stop using the antibiotic even if you start to feel better.  Managing constipation    Proctitis may cause constipation. To prevent or treat constipation, you may need to:  Drink enough fluid to keep your pee (urine) pale yellow.  Take over-the-counter or prescription medicines.  Eat foods that are high in fiber, such as beans, whole grains, and fresh fruits and vegetables.  Limit foods that are high in fat and processed sugars, such as fried or sweet foods.  General instructions    A bathtub partially filled with water.  Take sitz baths as told by your provider. A sitz bath is a shallow, warm water bath that you sit down in. The water should only come up to your hips and should cover your butt.  Try not to eat right before bed.  Keep all follow-up visits. Your provider will need to make sure your condition is improving.  Contact a health care provider if:  Your symptoms do not get better with treatment.  Your symptoms get worse.  You have a fever.  Get help right away if:  You have blood in your poop.  There is blood coming from your rectum.  This information is not intended to replace advice given to you by your health care provider. Make sure you discuss any questions you have with your health care provider.    Document Revised: 08/07/2023 Document Reviewed: 08/07/2023  [employer] © 2025 [employer]  [employer] logo  Terms and Conditions  Privacy Policy  Editorial Policy  All content on this site: Copyright © 2025 [employer], its licensors, and contributors. All rights are reserved, including those for text and data mining, AI training, and similar technologies. For all open access content, the Creative Commons licensing terms apply.  Cookies are used by this site. To decline or learn more, visit our Cookies page.

## 2025-07-14 NOTE — ED PROVIDER NOTE - PHYSICAL EXAMINATION
Patient is awake, alert, orient x 3  Head is normocephalic, atraumatic  Heart sounds within normal limits, regular rhythm  Lungs are clear bilaterally, no respiratory distress  Abdomen soft, abdominal TTP diffusely worse RLQ, no rebound, no guarding  Extremities well-perfused, no edema  No rash

## 2025-07-14 NOTE — ED ADULT NURSE NOTE - OBJECTIVE STATEMENT
65yF AOx4 ambulatory, presents to  ED c/o RLQ abdominal pain x1 day. pt endorsing N/V/D, fever (tmax 101, advil taken PTA), tenderness upon palpation, and radiation to R flank. pt denies urinary symptoms, blood in vomit/ stool. PMH HLD, GERD. - IV inserted labs drawn and sent, pt medicated per EMAR, pending scans.

## 2025-07-18 ENCOUNTER — APPOINTMENT (OUTPATIENT)
Dept: ENDOCRINOLOGY | Facility: CLINIC | Age: 65
End: 2025-07-18
Payer: COMMERCIAL

## 2025-07-18 PROCEDURE — 96372 THER/PROPH/DIAG INJ SC/IM: CPT

## 2025-07-18 RX ORDER — ROMOSOZUMAB-AQQG 105 MG/1.17ML
105 INJECTION, SOLUTION SUBCUTANEOUS
Qty: 0 | Refills: 0 | Status: COMPLETED | OUTPATIENT
Start: 2025-07-16

## 2025-08-25 ENCOUNTER — APPOINTMENT (OUTPATIENT)
Dept: ENDOCRINOLOGY | Facility: CLINIC | Age: 65
End: 2025-08-25
Payer: COMMERCIAL

## 2025-08-25 DIAGNOSIS — M81.8 OTHER OSTEOPOROSIS W/OUT CURRENT PATHOLOGICAL FRACTURE: ICD-10-CM

## 2025-08-25 PROCEDURE — 96372 THER/PROPH/DIAG INJ SC/IM: CPT

## 2025-08-25 RX ORDER — ROMOSOZUMAB-AQQG 105 MG/1.17ML
105 INJECTION, SOLUTION SUBCUTANEOUS
Qty: 0 | Refills: 0 | Status: COMPLETED | OUTPATIENT
Start: 2025-08-20

## 2025-09-02 ENCOUNTER — APPOINTMENT (OUTPATIENT)
Dept: RADIOLOGY | Facility: CLINIC | Age: 65
End: 2025-09-02
Payer: COMMERCIAL

## 2025-09-02 ENCOUNTER — OUTPATIENT (OUTPATIENT)
Dept: OUTPATIENT SERVICES | Facility: HOSPITAL | Age: 65
LOS: 1 days | End: 2025-09-02
Payer: COMMERCIAL

## 2025-09-02 DIAGNOSIS — M41.9 SCOLIOSIS, UNSPECIFIED: ICD-10-CM

## 2025-09-02 DIAGNOSIS — Z90.710 ACQUIRED ABSENCE OF BOTH CERVIX AND UTERUS: Chronic | ICD-10-CM

## 2025-09-02 DIAGNOSIS — Z98.890 OTHER SPECIFIED POSTPROCEDURAL STATES: Chronic | ICD-10-CM

## 2025-09-02 DIAGNOSIS — Z98.891 HISTORY OF UTERINE SCAR FROM PREVIOUS SURGERY: Chronic | ICD-10-CM

## 2025-09-02 PROCEDURE — 72082 X-RAY EXAM ENTIRE SPI 2/3 VW: CPT

## 2025-09-02 PROCEDURE — 72082 X-RAY EXAM ENTIRE SPI 2/3 VW: CPT | Mod: 26

## 2025-09-09 ENCOUNTER — NON-APPOINTMENT (OUTPATIENT)
Age: 65
End: 2025-09-09

## 2025-09-09 ENCOUNTER — APPOINTMENT (OUTPATIENT)
Dept: NEUROSURGERY | Facility: CLINIC | Age: 65
End: 2025-09-09
Payer: COMMERCIAL

## 2025-09-09 VITALS
DIASTOLIC BLOOD PRESSURE: 76 MMHG | OXYGEN SATURATION: 97 % | HEART RATE: 95 BPM | SYSTOLIC BLOOD PRESSURE: 121 MMHG | WEIGHT: 117 LBS | HEIGHT: 58 IN | BODY MASS INDEX: 24.56 KG/M2

## 2025-09-09 DIAGNOSIS — M41.9 SCOLIOSIS, UNSPECIFIED: ICD-10-CM

## 2025-09-09 PROCEDURE — 99212 OFFICE O/P EST SF 10 MIN: CPT

## (undated) DEVICE — STERIS DEFENDO 3-PIECE KIT (AIR/WATER, SUCTION & BIOPSY VALVES)

## (undated) DEVICE — FORCEP ENDOJAW AGTR LC W NDL 2.8MM 230CM DISP

## (undated) DEVICE — FORCEP RADIAL JAW 4 240CM DISP